# Patient Record
Sex: FEMALE | Race: WHITE | Employment: OTHER
[De-identification: names, ages, dates, MRNs, and addresses within clinical notes are randomized per-mention and may not be internally consistent; named-entity substitution may affect disease eponyms.]

---

## 2017-06-03 ENCOUNTER — HEALTH MAINTENANCE LETTER (OUTPATIENT)
Age: 57
End: 2017-06-03

## 2017-09-15 ENCOUNTER — HOSPITAL ENCOUNTER (EMERGENCY)
Facility: CLINIC | Age: 57
Discharge: HOME | End: 2017-09-15
Attending: EMERGENCY MEDICINE | Admitting: EMERGENCY MEDICINE
Payer: COMMERCIAL

## 2017-09-15 VITALS
RESPIRATION RATE: 18 BRPM | BODY MASS INDEX: 24.99 KG/M2 | HEART RATE: 56 BPM | DIASTOLIC BLOOD PRESSURE: 93 MMHG | SYSTOLIC BLOOD PRESSURE: 144 MMHG | OXYGEN SATURATION: 99 % | TEMPERATURE: 97.3 F | WEIGHT: 150 LBS | HEIGHT: 65 IN

## 2017-09-15 DIAGNOSIS — R42 VERTIGO: ICD-10-CM

## 2017-09-15 DIAGNOSIS — E86.0 DEHYDRATION: ICD-10-CM

## 2017-09-15 LAB
ANION GAP SERPL CALCULATED.3IONS-SCNC: 7 MMOL/L (ref 3–14)
BASOPHILS # BLD AUTO: 0 10E9/L (ref 0–0.2)
BASOPHILS NFR BLD AUTO: 0.5 %
BUN SERPL-MCNC: 16 MG/DL (ref 7–30)
CALCIUM SERPL-MCNC: 9.4 MG/DL (ref 8.5–10.1)
CHLORIDE SERPL-SCNC: 105 MMOL/L (ref 94–109)
CO2 SERPL-SCNC: 28 MMOL/L (ref 20–32)
CREAT SERPL-MCNC: 0.75 MG/DL (ref 0.52–1.04)
DIFFERENTIAL METHOD BLD: NORMAL
EOSINOPHIL # BLD AUTO: 0.1 10E9/L (ref 0–0.7)
EOSINOPHIL NFR BLD AUTO: 1.7 %
ERYTHROCYTE [DISTWIDTH] IN BLOOD BY AUTOMATED COUNT: 12.4 % (ref 10–15)
GFR SERPL CREATININE-BSD FRML MDRD: 79 ML/MIN/1.7M2
GLUCOSE SERPL-MCNC: 106 MG/DL (ref 70–99)
HCT VFR BLD AUTO: 43.1 % (ref 35–47)
HGB BLD-MCNC: 13.9 G/DL (ref 11.7–15.7)
IMM GRANULOCYTES # BLD: 0 10E9/L (ref 0–0.4)
IMM GRANULOCYTES NFR BLD: 0.3 %
LYMPHOCYTES # BLD AUTO: 0.8 10E9/L (ref 0.8–5.3)
LYMPHOCYTES NFR BLD AUTO: 10.2 %
MCH RBC QN AUTO: 29.5 PG (ref 26.5–33)
MCHC RBC AUTO-ENTMCNC: 32.3 G/DL (ref 31.5–36.5)
MCV RBC AUTO: 92 FL (ref 78–100)
MONOCYTES # BLD AUTO: 0.4 10E9/L (ref 0–1.3)
MONOCYTES NFR BLD AUTO: 5.5 %
NEUTROPHILS # BLD AUTO: 6.4 10E9/L (ref 1.6–8.3)
NEUTROPHILS NFR BLD AUTO: 81.8 %
NRBC # BLD AUTO: 0 10*3/UL
NRBC BLD AUTO-RTO: 0 /100
PLATELET # BLD AUTO: 229 10E9/L (ref 150–450)
POTASSIUM SERPL-SCNC: 3.7 MMOL/L (ref 3.4–5.3)
RBC # BLD AUTO: 4.71 10E12/L (ref 3.8–5.2)
SODIUM SERPL-SCNC: 140 MMOL/L (ref 133–144)
WBC # BLD AUTO: 7.9 10E9/L (ref 4–11)

## 2017-09-15 PROCEDURE — 85025 COMPLETE CBC W/AUTO DIFF WBC: CPT | Performed by: EMERGENCY MEDICINE

## 2017-09-15 PROCEDURE — 25000128 H RX IP 250 OP 636: Performed by: EMERGENCY MEDICINE

## 2017-09-15 PROCEDURE — 99283 EMERGENCY DEPT VISIT LOW MDM: CPT | Mod: 25

## 2017-09-15 PROCEDURE — 96360 HYDRATION IV INFUSION INIT: CPT

## 2017-09-15 PROCEDURE — 25000131 ZZH RX MED GY IP 250 OP 636 PS 637: Performed by: EMERGENCY MEDICINE

## 2017-09-15 PROCEDURE — 80048 BASIC METABOLIC PNL TOTAL CA: CPT | Performed by: EMERGENCY MEDICINE

## 2017-09-15 RX ORDER — SODIUM CHLORIDE 9 MG/ML
1000 INJECTION, SOLUTION INTRAVENOUS CONTINUOUS
Status: DISCONTINUED | OUTPATIENT
Start: 2017-09-15 | End: 2017-09-15 | Stop reason: HOSPADM

## 2017-09-15 RX ORDER — MECLIZINE HYDROCHLORIDE 25 MG/1
25 TABLET ORAL ONCE
Status: COMPLETED | OUTPATIENT
Start: 2017-09-15 | End: 2017-09-15

## 2017-09-15 RX ORDER — MECLIZINE HYDROCHLORIDE 25 MG/1
25 TABLET ORAL EVERY 6 HOURS PRN
Qty: 30 TABLET | Refills: 1 | Status: SHIPPED | OUTPATIENT
Start: 2017-09-15

## 2017-09-15 RX ORDER — LIDOCAINE 40 MG/G
CREAM TOPICAL
Status: DISCONTINUED | OUTPATIENT
Start: 2017-09-15 | End: 2017-09-15 | Stop reason: HOSPADM

## 2017-09-15 RX ADMIN — SODIUM CHLORIDE 1000 ML: 9 INJECTION, SOLUTION INTRAVENOUS at 09:14

## 2017-09-15 RX ADMIN — MECLIZINE HYDROCHLORIDE 25 MG: 25 TABLET ORAL at 09:14

## 2017-09-15 ASSESSMENT — ENCOUNTER SYMPTOMS
HEADACHES: 0
NAUSEA: 0
PHOTOPHOBIA: 0
WEAKNESS: 1
FATIGUE: 1
DIARRHEA: 0
FEVER: 0
CHOKING: 0
SHORTNESS OF BREATH: 0
LIGHT-HEADEDNESS: 0
DIZZINESS: 1
NUMBNESS: 0
VOMITING: 0

## 2017-09-15 NOTE — ED PROVIDER NOTES
History     Chief Complaint:  Dizziness    HPI   Josie Childs is a 57 year old female who presents with dizziness. The patient reports that last night at approximately 2245 the patient had a sudden onset of vertigo-like symptoms with blurred vision and dizziness as if the room was spinning. She states her symptoms resolved overnight but this morning she woke up feeling abnormally weak/fatigues; she states her vertigo like symptoms returned when she turned over in bed as well. She has not had any trauma to her head or any other area, chest pains, numbness, or any other symptoms.    Allergies:  Carafate  Nexium  Tegretol     Medications:    hydrocortisone (PROCTOSOL HC) 2.5 % rectal cream  lovastatin (MEVACOR) 40 MG tablet  ranitidine (ZANTAC) 150 MG capsule  polycarbophil (FIBERCON) 625 MG tablet  polyethylene glycol (MIRALAX/GLYCOLAX) powder  EFFEXOR 37.5 MG OR TABS  ALPRAZOLAM  MIRALAX OR     Past Medical History:    Esophageal reflux  Fibromyalgia     Past Surgical History:    No pertinent past surgical history.    Family History:    Mother-Parkinson's  Father-diabetes, alcohol/drug use    Social History:  Smoking status: Never smoker  Alcohol use: No  Marital Status:        Review of Systems   Constitutional: Positive for fatigue. Negative for fever.   Eyes: Positive for visual disturbance. Negative for photophobia.   Respiratory: Negative for choking and shortness of breath.    Gastrointestinal: Negative for diarrhea, nausea and vomiting.   Neurological: Positive for dizziness and weakness. Negative for syncope, light-headedness, numbness and headaches.   All other systems reviewed and are negative.      Physical Exam     Patient Vitals for the past 24 hrs:   BP Temp Temp src Pulse Resp SpO2 Height Weight   09/15/17 0945 (!) 144/93 - - - - 99 % - -   09/15/17 0930 135/88 - - - - 100 % - -   09/15/17 0915 148/74 - - - - 98 % - -   09/15/17 0834 159/87 97.3  F (36.3  C) Temporal 56 18 100 % 1.651 m  "(5' 5\") 68 kg (150 lb)       Physical Exam  General: Patient is alert and interactive when I enter the room  Head:  The scalp, face, and head appear normal  Eyes:  The pupils are equal, round, and reactive to light    Conjunctivae and sclerae are normal  ENT:    External acoustic canals are normal    The oropharynx is normal without erythema.     Uvula is in the midline  Neck:  Normal range of motion  CV:  Regular rate. S1/S2. No murmurs.   Resp:  Lungs are clear without wheezes or rales. No distress  GI:  Abdomen is soft, no rigidity, guarding, or rebound    No distension. No tenderness to palpation in any quadrant.     MS:  Normal tone. Joints grossly normal without effusions.     No asymmetric leg swelling, calf or thigh tenderness.      Normal motor assessment of all extremities.  Skin:  No rash or lesions noted. Normal capillary refill noted  Neuro: CN's II-XII without obvious abnormality.  Gait is normal w/o ataxia.  Neg Romberg.  5/5 UE and LE strength which is symmetrical.     Reflexes are 2+ and symmetrical. Negative Pronator drift.      Finger to Nose testing is intact bilaterally.  No nystagmus.     Light touch is symmetrical bilateral UE's and LE's.  PERRLA, EOMI.      No meningismus and full neck AROM/PROM.  Psych:  Awake. Alert.  Normal affect.  Appropriate interactions.  Lymph: No anterior cervical lymphadenopathy noted    Emergency Department Course   Laboratory:  CBC:  WBC 7.9, HGB 13.9, , WNL  BMP: Glucose 106 (H), otherwise WNL (Creatinine 0.75)    Interventions:  (0914) Normal Saline, 1 liter, IV bolus  (0914) Meclizine 25mg PO once.     Emergency Department Course:  Nursing notes and vitals reviewed.  (0858) I performed an exam of the patient as documented above.    Blood was drawn from the patient. This was sent for laboratory testing, findings above.     Findings and plan explained to the patient. Patient discharged home with instructions regarding supportive care, medications, and " reasons to return. The importance of close follow-up was reviewed. The patient was prescribed Meclizine.      Impression & Plan      Medical Decision Making:  Josie Childs is a 57 year old female who presents for evaluation of vertigo. The differential diagnosis of vertigo is broad and includes common etiologies such as menieres disease, labyrinthitis, benign positional vertigo, otitis media, etc.  More serious etiologies considered include central etiologies such as tumor, intracerebral bleed, dissection, ischemic cerebral vascular accident.  The history, physical exam including detailed neurologic exam, and workup in the emergency room suggests a benign cause of vertigo today.  Patient feels improved after interventions noted above. Further outpatient management is indicated with vertigo medications.       No indication for advanced imaging at this point (CT/MRI) as there are no definite signs of a central and concerning etiology for the vertigo.      Vertigo precautions given for home.        Diagnosis:    ICD-10-CM   1. Vertigo R42   2. Dehydration E86.0       Disposition:  Patient is discharged to home.      Discharge Medications:  New Prescriptions    MECLIZINE (ANTIVERT) 25 MG TABLET    Take 1 tablet (25 mg) by mouth every 6 hours as needed for dizziness       Cole NEVES, am serving as a scribe on 9/15/2017 at 8:58 AM to personally document services performed by Dr. Talavera based on my observations and the provider's statements to me.      Cole Fernandez  9/15/2017   [facility] EMERGENCY DEPARTMENT       James Talavera MD  09/15/17 9989

## 2017-09-15 NOTE — ED AVS SNAPSHOT
[facility] Emergency Department    [address] [place] [address]    Phone:  [phone]    Fax:  [phone]                                       Josie Childs   MRN: [id number]    Department:  [facility] Emergency Department   Date of Visit:  9/15/2017           Patient Information     Date Of Birth          1960        Your diagnoses for this visit were:     Vertigo     Dehydration        You were seen by James Talavera MD.      Follow-up Information     Follow up with Clinic, [facility] In 3 days.    Contact information:    [phone]          Discharge Instructions         Managing Dizziness (Vertigo) with Medicines    Although medicines can't cure your problem, they can help control symptoms. Your doctor may prescribe medicines for a few weeks and then taper them off. Always take your medicine as prescribed. Never share your medicine with others.  Contact your healthcare provider right away if you have side effects from your medicines.   How medicines can help    Treat infection or inflammation. If you have an infection caused by bacteria, your doctor can prescribe antibiotics.    Limit conflicting balance signals. These medicines are often in pill form.    Ease nausea. Suppositories, pills, or shots can reduce vomiting.    Reduce pressure in the canals. Diuretics can be used to treat Meniere's disease. These medicines help your body get rid of extra fluid.    Ease other symptoms. Other medicines can help ease depression and anxiety caused by living with dizziness or fainting.  Date Last Reviewed: 11/1/2016 2000-2017 [employer]. [address] [place] [address], [place] [address]. All rights reserved. This information is not intended as a substitute for professional medical care. Always follow your healthcare professional's instructions.          24 Hour Appointment Hotline       To make an appointment at any [facility],  "call [phone] ([phone]). If you don't have a family doctor or clinic, we will help you find one. [place] clinics are conveniently located to serve the needs of you and your family.          ED Discharge Orders     PHYSICAL THERAPY REFERRAL       *This therapy referral will be filtered to a centralized scheduling office at [facility] and the patient will receive a call to schedule an appointment at a [place] location most convenient for them. *     [facility] provides Physical Therapy evaluation and treatment and many specialty services across the [place] system.  If requesting a specialty program, please choose from the list below.    If you have not heard from the scheduling office within 2 business days, please call [phone] for all locations, with the exception of [place], please call [phone].  Treatment: Evaluation & Treatment  Special Instructions/Modalities:     Special Programs: Balance/Vestibular    Please be aware that coverage of these services is subject to the terms and limitations of your health insurance plan.  Call member services at your health plan with any benefit or coverage questions.      **Note to Provider:  If you are referring outside of [place] for the therapy appointment, please list the name of the location in the \"special instructions\" above, print the referral and give to the patient to schedule the appointment.                     Review of your medicines      START taking        Dose / Directions Last dose taken    meclizine 25 MG tablet   Commonly known as:  ANTIVERT   Dose:  25 mg   Quantity:  30 tablet        Take 1 tablet (25 mg) by mouth every 6 hours as needed for dizziness   Refills:  1          Our records show that you are taking the medicines listed below. If these are incorrect, please call your family doctor or clinic.        Dose / Directions Last dose taken    ALPRAZOLAM        None Entered   Refills:  0 "        calcium polycarbophil 625 MG tablet   Commonly known as:  FIBERCON   Dose:  1 tablet   Quantity:  30 tablet        Take 1 tablet by mouth 2 times daily as needed.   Refills:  12        EFFEXOR 37.5 MG tablet   Generic drug:  venlafaxine        1 TABLET TWICE DAILY WITH FOOD   Refills:  0        hydrocortisone 2.5 % cream   Commonly known as:  PROCTOSOL HC        Denied, pt aware needs to be seen for rx, fyi   Refills:  0        lovastatin 40 MG tablet   Commonly known as:  MEVACOR   Dose:  40 mg   Quantity:  90 tablet        Take 1 tablet by mouth At Bedtime.   Refills:  3        * MIRALAX PO        None Entered   Refills:  0        * polyethylene glycol powder   Commonly known as:  MIRALAX/GLYCOLAX   Dose:  1 capful   Quantity:  510 g        Take 17 g by mouth daily.   Refills:  0        ranitidine 150 MG capsule   Commonly known as:  ZANTAC   Dose:  150 mg   Quantity:  180 capsule        Take 1 capsule by mouth 2 times daily.   Refills:  3        * Notice:  This list has 2 medication(s) that are the same as other medications prescribed for you. Read the directions carefully, and ask your doctor or other care provider to review them with you.            Prescriptions were sent or printed at these locations (1 Prescription)                   Other Prescriptions                Printed at Department/Unit printer (1 of 1)         meclizine (ANTIVERT) 25 MG tablet                Procedures and tests performed during your visit     Basic metabolic panel    CBC with platelets differential    Peripheral IV catheter      Orders Needing Specimen Collection     None      Pending Results     No orders found from 9/13/2017 to 9/16/2017.            Pending Culture Results     No orders found from 9/13/2017 to 9/16/2017.            Pending Results Instructions     If you had any lab results that were not finalized at the time of your Discharge, you can call the ED Lab Result RN at [phone]. You will be contacted by  this team for any positive Lab results or changes in treatment. The nurses are available 7 days a week from 10A to 6:30P.  You can leave a message 24 hours per day and they will return your call.        Test Results From Your Hospital Stay        9/15/2017  9:28 AM      Component Results     Component Value Ref Range & Units Status    WBC 7.9 4.0 - 11.0 10e9/L Final    RBC Count 4.71 3.8 - 5.2 10e12/L Final    Hemoglobin 13.9 11.7 - 15.7 g/dL Final    Hematocrit 43.1 35.0 - 47.0 % Final    MCV 92 78 - 100 fl Final    MCH 29.5 26.5 - 33.0 pg Final    MCHC 32.3 31.5 - 36.5 g/dL Final    RDW 12.4 10.0 - 15.0 % Final    Platelet Count 229 150 - 450 10e9/L Final    Diff Method Automated Method  Final    % Neutrophils 81.8 % Final    % Lymphocytes 10.2 % Final    % Monocytes 5.5 % Final    % Eosinophils 1.7 % Final    % Basophils 0.5 % Final    % Immature Granulocytes 0.3 % Final    Nucleated RBCs 0 0 /100 Final    Absolute Neutrophil 6.4 1.6 - 8.3 10e9/L Final    Absolute Lymphocytes 0.8 0.8 - 5.3 10e9/L Final    Absolute Monocytes 0.4 0.0 - 1.3 10e9/L Final    Absolute Eosinophils 0.1 0.0 - 0.7 10e9/L Final    Absolute Basophils 0.0 0.0 - 0.2 10e9/L Final    Abs Immature Granulocytes 0.0 0 - 0.4 10e9/L Final    Absolute Nucleated RBC 0.0  Final         9/15/2017  9:43 AM      Component Results     Component Value Ref Range & Units Status    Sodium 140 133 - 144 mmol/L Final    Potassium 3.7 3.4 - 5.3 mmol/L Final    Chloride 105 94 - 109 mmol/L Final    Carbon Dioxide 28 20 - 32 mmol/L Final    Anion Gap 7 3 - 14 mmol/L Final    Glucose 106 (H) 70 - 99 mg/dL Final    Urea Nitrogen 16 7 - 30 mg/dL Final    Creatinine 0.75 0.52 - 1.04 mg/dL Final    GFR Estimate 79 >60 mL/min/1.7m2 Final    Non  GFR Calc    GFR Estimate If Black >90 >60 mL/min/1.7m2 Final    African American GFR Calc    Calcium 9.4 8.5 - 10.1 mg/dL Final                Clinical Quality Measure: Blood Pressure Screening     Your blood  "pressure was checked while you were in the emergency department today. The last reading we obtained was  BP: (!) 144/93 . Please read the guidelines below about what these numbers mean and what you should do about them.  If your systolic blood pressure (the top number) is less than 120 and your diastolic blood pressure (the bottom number) is less than 80, then your blood pressure is normal. There is nothing more that you need to do about it.  If your systolic blood pressure (the top number) is 120-139 or your diastolic blood pressure (the bottom number) is 80-89, your blood pressure may be higher than it should be. You should have your blood pressure rechecked within a year by a primary care provider.  If your systolic blood pressure (the top number) is 140 or greater or your diastolic blood pressure (the bottom number) is 90 or greater, you may have high blood pressure. High blood pressure is treatable, but if left untreated over time it can put you at risk for heart attack, stroke, or kidney failure. You should have your blood pressure rechecked by a primary care provider within the next 4 weeks.  If your provider in the emergency department today gave you specific instructions to follow-up with your doctor or provider even sooner than that, you should follow that instruction and not wait for up to 4 weeks for your follow-up visit.        Thank you for choosing [facility]       Thank you for choosing [facility] for your care. Our goal is always to provide you with excellent care. Hearing back from our patients is one way we can continue to improve our services. Please take a few minutes to complete the written survey that you may receive in the mail after you visit with us. Thank you!        [employer] Information     [employer] lets you send messages to your doctor, view your test results, renew your prescriptions, schedule appointments and more. To sign up, go to [email or web address] . Click on \"Log in\" on the left " "side of the screen, which will take you to the Welcome page. Then click on \"Sign up Now\" on the right side of the page.     You will be asked to enter the access code listed below, as well as some personal information. Please follow the directions to create your username and password.     Your access code is: [email or web address]  Expires: 2017 10:41 AM     Your access code will  in 90 days. If you need help or a new code, please call your [facility] clinic or [phone].        Care EveryWhere ID     This is your Care EveryWhere ID. This could be used by other organizations to access your [facility] medical records  [id number]        Equal Access to Services     MILKA CRABTREE : Joyce Mosley, oliva hung, lachelle soto, amara beaulieu. So [facility] [phone].    ATENCIÓN: Si habla español, tiene a gustafson disposición servicios gratuitos de asistencia lingüística. Llame al [phone].    We comply with applicable federal civil rights laws and [place] laws. We do not discriminate on the basis of race, color, national origin, age, disability sex, sexual orientation or gender identity.            After Visit Summary       This is your record. Keep this with you and show to your community pharmacist(s) and doctor(s) at your next visit.                  "

## 2017-09-15 NOTE — DISCHARGE INSTRUCTIONS
Managing Dizziness (Vertigo) with Medicines    Although medicines can't cure your problem, they can help control symptoms. Your doctor may prescribe medicines for a few weeks and then taper them off. Always take your medicine as prescribed. Never share your medicine with others.  Contact your healthcare provider right away if you have side effects from your medicines.   How medicines can help    Treat infection or inflammation. If you have an infection caused by bacteria, your doctor can prescribe antibiotics.    Limit conflicting balance signals. These medicines are often in pill form.    Ease nausea. Suppositories, pills, or shots can reduce vomiting.    Reduce pressure in the canals. Diuretics can be used to treat Meniere's disease. These medicines help your body get rid of extra fluid.    Ease other symptoms. Other medicines can help ease depression and anxiety caused by living with dizziness or fainting.  Date Last Reviewed: 11/1/2016 2000-2017 [employer]. [address] [place] [address], [place] [address]. All rights reserved. This information is not intended as a substitute for professional medical care. Always follow your healthcare professional's instructions.

## 2017-09-15 NOTE — ED NOTES
"ABCs intact. Pt c/o onset of dizziness starting around 2245 last night. Pt states she laid down and when she rolled over, dizziness got worse. Pt states the \"room was spinning\". Pt states has low bg but has never been dx. Pt would like her BG checked.  "

## 2017-09-15 NOTE — ED NOTES
"Patient feels better after fluids and medication. During this discussion patient reported that she has been feeling \"overwhelmed\" with the \"passing of my parents three years ago.\" She discussed some relationship problems with siblings \"because of my parents passing.\" Daughter present with patient and elaborated on the past three years noting that her mom is \"still grieving.\" Notes that \"this is probably the result of my mental exhaustion.\"  Patient denies being suicidal or homicidal, reports having her anxiety \"controlled\" with Effexor. She does have a [employer] counselor that she sees and feels like she has a good support system. Discussed giving patient resources for psychologist and patient and daughter agreed. MD notified.    "

## 2017-09-15 NOTE — ED AVS SNAPSHOT
[facility] Emergency Department    [address]    [facility] [address]    Phone:  [phone]    Fax:  [phone]                                       Josie Childs   MRN: [id number]    Department:  [facility] Emergency Department   Date of Visit:  9/15/2017           After Visit Summary Signature Page     I have received my discharge instructions, and my questions have been answered. I have discussed any challenges I see with this plan with the nurse or doctor.    ..........................................................................................................................................  Patient/Patient Representative Signature      ..........................................................................................................................................  Patient Representative Print Name and Relationship to Patient    ..................................................               ................................................  Date                                            Time    ..........................................................................................................................................  Reviewed by Signature/Title    ...................................................              ..............................................  Date                                                            Time

## 2018-10-31 ENCOUNTER — HOSPITAL ENCOUNTER (EMERGENCY)
Facility: CLINIC | Age: 58
Discharge: ANOTHER HOSPITAL | End: 2018-10-31
Attending: EMERGENCY MEDICINE | Admitting: INTERNAL MEDICINE
Payer: COMMERCIAL

## 2018-10-31 ENCOUNTER — APPOINTMENT (OUTPATIENT)
Dept: GENERAL RADIOLOGY | Facility: CLINIC | Age: 58
End: 2018-10-31
Attending: EMERGENCY MEDICINE
Payer: COMMERCIAL

## 2018-10-31 ENCOUNTER — HOSPITAL ENCOUNTER (INPATIENT)
Facility: CLINIC | Age: 58
LOS: 3 days | Discharge: HOME | DRG: 470 | End: 2018-11-03
Attending: INTERNAL MEDICINE | Admitting: HOSPITALIST
Payer: COMMERCIAL

## 2018-10-31 VITALS
HEART RATE: 85 BPM | OXYGEN SATURATION: 96 % | RESPIRATION RATE: 20 BRPM | TEMPERATURE: 97.7 F | DIASTOLIC BLOOD PRESSURE: 70 MMHG | WEIGHT: 155 LBS | SYSTOLIC BLOOD PRESSURE: 103 MMHG | HEIGHT: 65 IN | BODY MASS INDEX: 25.83 KG/M2

## 2018-10-31 DIAGNOSIS — S72.001A CLOSED FRACTURE OF RIGHT HIP, INITIAL ENCOUNTER (H): Primary | ICD-10-CM

## 2018-10-31 DIAGNOSIS — S72.001A CLOSED FRACTURE OF NECK OF RIGHT FEMUR, INITIAL ENCOUNTER (H): ICD-10-CM

## 2018-10-31 PROBLEM — S72.009A HIP FRACTURE (H): Status: ACTIVE | Noted: 2018-10-31

## 2018-10-31 LAB
ANION GAP SERPL CALCULATED.3IONS-SCNC: 8 MMOL/L (ref 3–14)
BASOPHILS # BLD AUTO: 0 10E9/L (ref 0–0.2)
BASOPHILS NFR BLD AUTO: 0.3 %
BUN SERPL-MCNC: 16 MG/DL (ref 7–30)
CALCIUM SERPL-MCNC: 8.5 MG/DL (ref 8.5–10.1)
CHLORIDE SERPL-SCNC: 108 MMOL/L (ref 94–109)
CO2 SERPL-SCNC: 26 MMOL/L (ref 20–32)
CREAT SERPL-MCNC: 0.76 MG/DL (ref 0.52–1.04)
DIFFERENTIAL METHOD BLD: ABNORMAL
EOSINOPHIL # BLD AUTO: 0.1 10E9/L (ref 0–0.7)
EOSINOPHIL NFR BLD AUTO: 0.5 %
ERYTHROCYTE [DISTWIDTH] IN BLOOD BY AUTOMATED COUNT: 12.8 % (ref 10–15)
GFR SERPL CREATININE-BSD FRML MDRD: 78 ML/MIN/1.7M2
GLUCOSE SERPL-MCNC: 93 MG/DL (ref 70–99)
HCT VFR BLD AUTO: 33.6 % (ref 35–47)
HCT VFR BLD AUTO: 38.7 % (ref 35–47)
HGB BLD-MCNC: 11.2 G/DL (ref 11.7–15.7)
HGB BLD-MCNC: 12.7 G/DL (ref 11.7–15.7)
IMM GRANULOCYTES # BLD: 0.1 10E9/L (ref 0–0.4)
IMM GRANULOCYTES NFR BLD: 0.7 %
INTERPRETATION ECG - MUSE: NORMAL
LYMPHOCYTES # BLD AUTO: 1.1 10E9/L (ref 0.8–5.3)
LYMPHOCYTES NFR BLD AUTO: 9 %
MCH RBC QN AUTO: 29.9 PG (ref 26.5–33)
MCHC RBC AUTO-ENTMCNC: 32.8 G/DL (ref 31.5–36.5)
MCV RBC AUTO: 91 FL (ref 78–100)
MONOCYTES # BLD AUTO: 0.7 10E9/L (ref 0–1.3)
MONOCYTES NFR BLD AUTO: 5.9 %
NEUTROPHILS # BLD AUTO: 9.9 10E9/L (ref 1.6–8.3)
NEUTROPHILS NFR BLD AUTO: 83.6 %
NRBC # BLD AUTO: 0 10*3/UL
NRBC BLD AUTO-RTO: 0 /100
PLATELET # BLD AUTO: 255 10E9/L (ref 150–450)
POTASSIUM SERPL-SCNC: 3.8 MMOL/L (ref 3.4–5.3)
RBC # BLD AUTO: 4.25 10E12/L (ref 3.8–5.2)
SODIUM SERPL-SCNC: 142 MMOL/L (ref 133–144)
WBC # BLD AUTO: 11.8 10E9/L (ref 4–11)

## 2018-10-31 PROCEDURE — 96375 TX/PRO/DX INJ NEW DRUG ADDON: CPT

## 2018-10-31 PROCEDURE — 25000128 H RX IP 250 OP 636: Performed by: EMERGENCY MEDICINE

## 2018-10-31 PROCEDURE — 99285 EMERGENCY DEPT VISIT HI MDM: CPT | Mod: 25

## 2018-10-31 PROCEDURE — 12000007 ZZH R&B INTERMEDIATE

## 2018-10-31 PROCEDURE — 25000132 ZZH RX MED GY IP 250 OP 250 PS 637: Performed by: HOSPITALIST

## 2018-10-31 PROCEDURE — 25000128 H RX IP 250 OP 636

## 2018-10-31 PROCEDURE — 85025 COMPLETE CBC W/AUTO DIFF WBC: CPT | Performed by: EMERGENCY MEDICINE

## 2018-10-31 PROCEDURE — 85018 HEMOGLOBIN: CPT | Performed by: HOSPITALIST

## 2018-10-31 PROCEDURE — 36415 COLL VENOUS BLD VENIPUNCTURE: CPT | Performed by: HOSPITALIST

## 2018-10-31 PROCEDURE — 96361 HYDRATE IV INFUSION ADD-ON: CPT

## 2018-10-31 PROCEDURE — 72170 X-RAY EXAM OF PELVIS: CPT

## 2018-10-31 PROCEDURE — 85014 HEMATOCRIT: CPT | Performed by: HOSPITALIST

## 2018-10-31 PROCEDURE — 96376 TX/PRO/DX INJ SAME DRUG ADON: CPT

## 2018-10-31 PROCEDURE — 99223 1ST HOSP IP/OBS HIGH 75: CPT | Mod: AI | Performed by: INTERNAL MEDICINE

## 2018-10-31 PROCEDURE — 73552 X-RAY EXAM OF FEMUR 2/>: CPT | Mod: RT

## 2018-10-31 PROCEDURE — 36415 COLL VENOUS BLD VENIPUNCTURE: CPT | Performed by: EMERGENCY MEDICINE

## 2018-10-31 PROCEDURE — 96374 THER/PROPH/DIAG INJ IV PUSH: CPT

## 2018-10-31 PROCEDURE — 25000132 ZZH RX MED GY IP 250 OP 250 PS 637: Performed by: EMERGENCY MEDICINE

## 2018-10-31 PROCEDURE — 99222 1ST HOSP IP/OBS MODERATE 55: CPT | Mod: AI | Performed by: HOSPITALIST

## 2018-10-31 PROCEDURE — 80048 BASIC METABOLIC PNL TOTAL CA: CPT | Performed by: EMERGENCY MEDICINE

## 2018-10-31 PROCEDURE — 25000128 H RX IP 250 OP 636: Performed by: HOSPITALIST

## 2018-10-31 RX ORDER — DIAZEPAM 2 MG
2 TABLET ORAL EVERY 6 HOURS PRN
Status: DISCONTINUED | OUTPATIENT
Start: 2018-10-31 | End: 2018-11-03 | Stop reason: HOSPADM

## 2018-10-31 RX ORDER — ONDANSETRON 4 MG/1
4 TABLET, ORALLY DISINTEGRATING ORAL EVERY 6 HOURS PRN
Status: CANCELLED | OUTPATIENT
Start: 2018-10-31

## 2018-10-31 RX ORDER — POTASSIUM CHLORIDE 1.5 G/1.58G
20-40 POWDER, FOR SOLUTION ORAL
Status: DISCONTINUED | OUTPATIENT
Start: 2018-10-31 | End: 2018-11-03 | Stop reason: HOSPADM

## 2018-10-31 RX ORDER — HYDROMORPHONE HYDROCHLORIDE 1 MG/ML
0.5 INJECTION, SOLUTION INTRAMUSCULAR; INTRAVENOUS; SUBCUTANEOUS
Status: COMPLETED | OUTPATIENT
Start: 2018-10-31 | End: 2018-10-31

## 2018-10-31 RX ORDER — ALPRAZOLAM 0.5 MG
0.5 TABLET ORAL AT BEDTIME
COMMUNITY

## 2018-10-31 RX ORDER — PROCHLORPERAZINE 25 MG
25 SUPPOSITORY, RECTAL RECTAL EVERY 12 HOURS PRN
Status: CANCELLED | OUTPATIENT
Start: 2018-10-31

## 2018-10-31 RX ORDER — DEXTROSE MONOHYDRATE, SODIUM CHLORIDE, AND POTASSIUM CHLORIDE 50; 1.49; 9 G/1000ML; G/1000ML; G/1000ML
INJECTION, SOLUTION INTRAVENOUS CONTINUOUS
Status: CANCELLED | OUTPATIENT
Start: 2018-11-01

## 2018-10-31 RX ORDER — IBUPROFEN 600 MG/1
600 TABLET, FILM COATED ORAL EVERY 6 HOURS PRN
Status: CANCELLED | OUTPATIENT
Start: 2018-10-31

## 2018-10-31 RX ORDER — CYCLOBENZAPRINE HCL 10 MG
10 TABLET ORAL ONCE
Status: COMPLETED | OUTPATIENT
Start: 2018-10-31 | End: 2018-10-31

## 2018-10-31 RX ORDER — PROCHLORPERAZINE MALEATE 10 MG
10 TABLET ORAL EVERY 6 HOURS PRN
Status: CANCELLED | OUTPATIENT
Start: 2018-10-31

## 2018-10-31 RX ORDER — VENLAFAXINE 37.5 MG/1
37.5 TABLET ORAL EVERY MORNING
Status: CANCELLED | OUTPATIENT
Start: 2018-11-01

## 2018-10-31 RX ORDER — POLYETHYLENE GLYCOL 3350 17 G/17G
17 POWDER, FOR SOLUTION ORAL DAILY PRN
Status: CANCELLED | OUTPATIENT
Start: 2018-10-31

## 2018-10-31 RX ORDER — DOCUSATE SODIUM 100 MG/1
100 CAPSULE, LIQUID FILLED ORAL 2 TIMES DAILY
Status: CANCELLED | OUTPATIENT
Start: 2018-10-31

## 2018-10-31 RX ORDER — ONDANSETRON 2 MG/ML
4 INJECTION INTRAMUSCULAR; INTRAVENOUS EVERY 6 HOURS PRN
Status: CANCELLED | OUTPATIENT
Start: 2018-10-31

## 2018-10-31 RX ORDER — SODIUM CHLORIDE 9 MG/ML
INJECTION, SOLUTION INTRAVENOUS CONTINUOUS
Status: DISCONTINUED | OUTPATIENT
Start: 2018-11-01 | End: 2018-11-03 | Stop reason: HOSPADM

## 2018-10-31 RX ORDER — ACETAMINOPHEN 325 MG/1
650 TABLET ORAL EVERY 4 HOURS PRN
Status: CANCELLED | OUTPATIENT
Start: 2018-10-31

## 2018-10-31 RX ORDER — FENTANYL CITRATE 50 UG/ML
50 INJECTION, SOLUTION INTRAMUSCULAR; INTRAVENOUS ONCE
Status: COMPLETED | OUTPATIENT
Start: 2018-10-31 | End: 2018-10-31

## 2018-10-31 RX ORDER — POTASSIUM CHLORIDE 7.45 MG/ML
10 INJECTION INTRAVENOUS
Status: DISCONTINUED | OUTPATIENT
Start: 2018-10-31 | End: 2018-11-03 | Stop reason: HOSPADM

## 2018-10-31 RX ORDER — ONDANSETRON 2 MG/ML
4 INJECTION INTRAMUSCULAR; INTRAVENOUS EVERY 6 HOURS PRN
Status: DISCONTINUED | OUTPATIENT
Start: 2018-10-31 | End: 2018-11-01

## 2018-10-31 RX ORDER — FENTANYL CITRATE 50 UG/ML
INJECTION, SOLUTION INTRAMUSCULAR; INTRAVENOUS
Status: COMPLETED
Start: 2018-10-31 | End: 2018-10-31

## 2018-10-31 RX ORDER — HYDROMORPHONE HYDROCHLORIDE 1 MG/ML
.3-.5 INJECTION, SOLUTION INTRAMUSCULAR; INTRAVENOUS; SUBCUTANEOUS
Status: CANCELLED | OUTPATIENT
Start: 2018-10-31

## 2018-10-31 RX ORDER — NALOXONE HYDROCHLORIDE 0.4 MG/ML
.1-.4 INJECTION, SOLUTION INTRAMUSCULAR; INTRAVENOUS; SUBCUTANEOUS
Status: CANCELLED | OUTPATIENT
Start: 2018-10-31

## 2018-10-31 RX ORDER — BISACODYL 10 MG
10 SUPPOSITORY, RECTAL RECTAL DAILY PRN
Status: DISCONTINUED | OUTPATIENT
Start: 2018-10-31 | End: 2018-11-03 | Stop reason: HOSPADM

## 2018-10-31 RX ORDER — ONDANSETRON 4 MG/1
4 TABLET, ORALLY DISINTEGRATING ORAL EVERY 6 HOURS PRN
Status: DISCONTINUED | OUTPATIENT
Start: 2018-10-31 | End: 2018-11-01

## 2018-10-31 RX ORDER — ACETAMINOPHEN 325 MG/1
650 TABLET ORAL EVERY 4 HOURS PRN
Status: DISCONTINUED | OUTPATIENT
Start: 2018-10-31 | End: 2018-11-01

## 2018-10-31 RX ORDER — OXYCODONE HYDROCHLORIDE 5 MG/1
5 TABLET ORAL EVERY 4 HOURS PRN
Status: DISCONTINUED | OUTPATIENT
Start: 2018-10-31 | End: 2018-11-02

## 2018-10-31 RX ORDER — LORAZEPAM 2 MG/ML
0.5 INJECTION INTRAMUSCULAR ONCE
Status: COMPLETED | OUTPATIENT
Start: 2018-10-31 | End: 2018-10-31

## 2018-10-31 RX ORDER — VENLAFAXINE HYDROCHLORIDE 37.5 MG/1
37.5 CAPSULE, EXTENDED RELEASE ORAL EVERY MORNING
COMMUNITY

## 2018-10-31 RX ORDER — NALOXONE HYDROCHLORIDE 0.4 MG/ML
.1-.4 INJECTION, SOLUTION INTRAMUSCULAR; INTRAVENOUS; SUBCUTANEOUS
Status: DISCONTINUED | OUTPATIENT
Start: 2018-10-31 | End: 2018-11-02

## 2018-10-31 RX ORDER — POTASSIUM CHLORIDE 29.8 MG/ML
20 INJECTION INTRAVENOUS
Status: DISCONTINUED | OUTPATIENT
Start: 2018-10-31 | End: 2018-11-03 | Stop reason: HOSPADM

## 2018-10-31 RX ORDER — ACETAMINOPHEN 650 MG/1
650 SUPPOSITORY RECTAL EVERY 4 HOURS PRN
Status: DISCONTINUED | OUTPATIENT
Start: 2018-10-31 | End: 2018-11-01

## 2018-10-31 RX ORDER — LOVASTATIN 20 MG
40 TABLET ORAL EVERY EVENING
Status: DISCONTINUED | OUTPATIENT
Start: 2018-10-31 | End: 2018-11-03 | Stop reason: HOSPADM

## 2018-10-31 RX ORDER — VENLAFAXINE HYDROCHLORIDE 37.5 MG/1
37.5 CAPSULE, EXTENDED RELEASE ORAL EVERY MORNING
Status: DISCONTINUED | OUTPATIENT
Start: 2018-11-01 | End: 2018-11-03 | Stop reason: HOSPADM

## 2018-10-31 RX ORDER — OXYCODONE HYDROCHLORIDE 5 MG/1
5-10 TABLET ORAL EVERY 4 HOURS PRN
Status: CANCELLED | OUTPATIENT
Start: 2018-10-31

## 2018-10-31 RX ORDER — POLYETHYLENE GLYCOL 3350 17 G/17G
1 POWDER, FOR SOLUTION ORAL DAILY PRN
COMMUNITY

## 2018-10-31 RX ORDER — AMOXICILLIN 250 MG
1 CAPSULE ORAL 2 TIMES DAILY
Status: DISCONTINUED | OUTPATIENT
Start: 2018-10-31 | End: 2018-11-01

## 2018-10-31 RX ORDER — SODIUM CHLORIDE 9 MG/ML
1000 INJECTION, SOLUTION INTRAVENOUS CONTINUOUS
Status: DISCONTINUED | OUTPATIENT
Start: 2018-10-31 | End: 2018-10-31 | Stop reason: HOSPADM

## 2018-10-31 RX ORDER — KETOROLAC TROMETHAMINE 30 MG/ML
30 INJECTION, SOLUTION INTRAMUSCULAR; INTRAVENOUS EVERY 6 HOURS PRN
Status: DISCONTINUED | OUTPATIENT
Start: 2018-10-31 | End: 2018-11-01 | Stop reason: DRUGHIGH

## 2018-10-31 RX ORDER — CYCLOBENZAPRINE HCL 10 MG
10 TABLET ORAL 3 TIMES DAILY PRN
Status: CANCELLED | OUTPATIENT
Start: 2018-10-31

## 2018-10-31 RX ORDER — ALPRAZOLAM 0.5 MG
0.5 TABLET ORAL
Status: CANCELLED | OUTPATIENT
Start: 2018-10-31

## 2018-10-31 RX ORDER — ALPRAZOLAM 0.5 MG
0.5 TABLET ORAL AT BEDTIME
Status: DISCONTINUED | OUTPATIENT
Start: 2018-10-31 | End: 2018-11-01

## 2018-10-31 RX ORDER — POTASSIUM CHLORIDE 1500 MG/1
20-40 TABLET, EXTENDED RELEASE ORAL
Status: DISCONTINUED | OUTPATIENT
Start: 2018-10-31 | End: 2018-11-03 | Stop reason: HOSPADM

## 2018-10-31 RX ORDER — MECLIZINE HYDROCHLORIDE 25 MG/1
25 TABLET ORAL EVERY 6 HOURS PRN
Status: DISCONTINUED | OUTPATIENT
Start: 2018-10-31 | End: 2018-11-03 | Stop reason: HOSPADM

## 2018-10-31 RX ORDER — POTASSIUM CL/LIDO/0.9 % NACL 10MEQ/0.1L
10 INTRAVENOUS SOLUTION, PIGGYBACK (ML) INTRAVENOUS
Status: DISCONTINUED | OUTPATIENT
Start: 2018-10-31 | End: 2018-11-03 | Stop reason: HOSPADM

## 2018-10-31 RX ORDER — AMOXICILLIN 250 MG
2 CAPSULE ORAL 2 TIMES DAILY PRN
Status: CANCELLED | OUTPATIENT
Start: 2018-10-31

## 2018-10-31 RX ORDER — AMOXICILLIN 250 MG
2 CAPSULE ORAL 2 TIMES DAILY
Status: DISCONTINUED | OUTPATIENT
Start: 2018-10-31 | End: 2018-11-01

## 2018-10-31 RX ORDER — AMOXICILLIN 250 MG
1 CAPSULE ORAL 2 TIMES DAILY PRN
Status: CANCELLED | OUTPATIENT
Start: 2018-10-31

## 2018-10-31 RX ORDER — POLYETHYLENE GLYCOL 3350 17 G/17G
17 POWDER, FOR SOLUTION ORAL DAILY PRN
Status: DISCONTINUED | OUTPATIENT
Start: 2018-10-31 | End: 2018-11-03 | Stop reason: HOSPADM

## 2018-10-31 RX ADMIN — Medication 0.5 MG: at 17:31

## 2018-10-31 RX ADMIN — SENNOSIDES AND DOCUSATE SODIUM 1 TABLET: 8.6; 5 TABLET ORAL at 22:24

## 2018-10-31 RX ADMIN — SODIUM CHLORIDE 1000 ML: 9 INJECTION, SOLUTION INTRAVENOUS at 16:52

## 2018-10-31 RX ADMIN — FENTANYL CITRATE 100 MCG: 50 INJECTION INTRAMUSCULAR; INTRAVENOUS at 14:24

## 2018-10-31 RX ADMIN — CYCLOBENZAPRINE HYDROCHLORIDE 10 MG: 10 TABLET, FILM COATED ORAL at 16:50

## 2018-10-31 RX ADMIN — SODIUM CHLORIDE: 9 INJECTION, SOLUTION INTRAVENOUS at 22:30

## 2018-10-31 RX ADMIN — Medication 0.5 MG: at 15:42

## 2018-10-31 RX ADMIN — LORAZEPAM 0.5 MG: 2 INJECTION INTRAMUSCULAR; INTRAVENOUS at 18:19

## 2018-10-31 RX ADMIN — Medication 0.5 MG: at 16:53

## 2018-10-31 RX ADMIN — OXYCODONE HYDROCHLORIDE 5 MG: 5 TABLET ORAL at 22:24

## 2018-10-31 RX ADMIN — SODIUM CHLORIDE 1000 ML: 9 INJECTION, SOLUTION INTRAVENOUS at 15:42

## 2018-10-31 RX ADMIN — FENTANYL CITRATE 100 MCG: 50 INJECTION, SOLUTION INTRAMUSCULAR; INTRAVENOUS at 14:24

## 2018-10-31 NOTE — IP AVS SNAPSHOT
[address] [facility]    [address] [place] [address]    Phone:  [phone]                                       After Visit Summary   10/31/2018    Josie Childs    MRN: [id number]           After Visit Summary Signature Page     I have received my discharge instructions, and my questions have been answered. I have discussed any challenges I see with this plan with the nurse or doctor.    ..........................................................................................................................................  Patient/Patient Representative Signature      ..........................................................................................................................................  Patient Representative Print Name and Relationship to Patient    ..................................................               ................................................  Date                                   Time    ..........................................................................................................................................  Reviewed by Signature/Title    ...................................................              ..............................................  Date                                               Time          22EPIC Rev 08/18

## 2018-10-31 NOTE — PHARMACY-ADMISSION MEDICATION HISTORY
Admission medication history interview status for this patient is complete. See [facility] admission navigator for allergy information, prior to admission medications and immunization status.     Medication history interview source(s):Patient, [facility] mail order pharmacy  Medication history resources (including written lists, pill bottles, clinic record): Care Everywhere records.  Primary pharmacy: [facility] mail order & [facility] in [place]    Changes made to PTA medication list:  Added:  MVI, Dose for Xanax  Deleted:  Fibercon, Zantaz & Miralax (duplicate)  Changed:  Effexor 37.5mg tab BID ---> Effexor XR 37.5mg caps daily    Actions taken by pharmacist (provider contacted, etc): Called [facility] for get info on Zanax & Effexor XR.     Additional medication history information:None    Medication reconciliation/reorder completed by provider prior to medication history? No    Do you take OTC medications (eg tylenol, ibuprofen, fish oil, eye/ear drops, etc)?  Yes, as listed.    For patients on insulin therapy:  No     Prior to Admission medications    Medication Sig Last Dose Taking? Auth Provider   ALPRAZolam (XANAX) 0.5 MG tablet Take 0.5 mg by mouth At Bedtime 10/30/2018 at pm Yes Unknown, Entered By History   hydrocortisone (PROCTOSOL HC) 2.5 % cream Place rectally as needed for hemorrhoids prn Yes Unknown, Entered By History   lovastatin (MEVACOR) 40 MG tablet Take 40 mg by mouth every evening  10/30/2018 at pm Yes    meclizine (ANTIVERT) 25 MG tablet Take 1 tablet (25 mg) by mouth every 6 hours as needed for dizziness prn  James Talavera MD   Multiple Vitamins-Minerals (MULTIVITAMIN ADULT PO) Take 1 tablet by mouth daily 10/31/2018 at am Yes Unknown, Entered By History   polyethylene glycol (MIRALAX/GLYCOLAX) Packet Take 1 packet by mouth daily as needed for constipation prn Yes Unknown, Entered By History   venlafaxine (EFFEXOR-XR) 37.5 MG 24 hr capsule Take 37.5 mg by mouth every morning 10/31/2018 at am Yes  Unknown, Entered By History

## 2018-10-31 NOTE — H&P
Admitted:     10/31/2018      CHIEF COMPLAINT:  Right hip pain, status post fall.      HISTORY OF PRESENT ILLNESS:  Josie Childs is a 58-year-old female with a history of fibromyalgia, anxiety, gastroesophageal reflux disease, hypercholesterolemia, who presented to the emergency room with right hip pain after she fell down.  This afternoon while she was holding her [id number] grandson, she tripped over the rug and landed on her right hip side, and sustained trauma.  She denied hitting her head or loss of consciousness.  Her grandson was not hurt.  She started to have significant pain when she tried to get up.  She thought she had a hip dislocation and the EMS was called and brought her to the emergency room.  The patient denied any headache, no blurring of vision, no lower extremity weakness or tingling sensation.        In the emergency room, she was evaluated.  She had an x-ray done which showed right femoral neck fracture.  The ED physician, Dr. Trejo then discussed with her he will call the orthopedic surgeon on-call and is planning to have surgical repair tomorrow.      PAST MEDICAL HISTORY:   1.  Gastroesophageal reflux disease.   2.  Fibromyalgia.   3.  Irritable bowel syndrome.   4.  Anxiety.   5.  Depression.   6.  Vertigo.      PAST SURGICAL HISTORY:  Tonsillectomy and adenoidectomy, cholecystectomy, and colonoscopy.      FAMILY HISTORY:  Significant for Parkinson's disease, diabetes, alcohol abuse, osteoporosis, and breast cancer.      SOCIAL HISTORY:  She does not smoke.  She does not drink alcohol.  She does not use illicit drugs.  She lives with her .  She is pretty active, walks 3-5 miles every day.  She is in the emergency room with her .      REVIEW OF SYSTEMS:  Ten points reviewed, all are negative except those mentioned in the history of present illness.      HOME MEDICATIONS:  Needs to be reviewed, but includes Effexor, alprazolam, MiraLAX, and Zantac.      ALLERGIES:   CARAFATE, NEXIUM, TEGRETOL.       PHYSICAL EXAMINATION:   GENERAL:  The patient is awake, alert, oriented, comfortable, not in any form of distress.   VITAL SIGNS:  Blood pressure 155/83,  pulse rate 85, temperature 97.7, oxygen saturation 97%.   HEENT:  Pink and nonicteric.  Extraocular muscle movement intact.   NECK:  Supple, no JVD, no thyromegaly.   CHEST:  Good air entry bilaterally.  No wheezing, crackles or rales.   CARDIOVASCULAR:  S1, S2 were heard.  No gallop or murmur.   ABDOMEN:  Soft, nontender, nondistended, positive bowel sounds.   EXTREMITIES:  Right hip tenderness with limited range of motion, no edema, cyanosis or clubbing.   NEUROLOGIC:  No focal neurologic deficits.  Sensation is intact.  She moves her left leg.  The right leg has significant pain with movement.   PSYCHIATRIC:  Normal mood and affect, looks slightly anxious, otherwise no agitation.      DIAGNOSTIC TESTS OF INTEREST:  Electrolytes all normal, creatinine 0.762, BUN 16, calcium 8.5, glucose 93.  WBC 11.8, hemoglobin 12.7, platelets 255.  X-ray of the femur and pelvis showed right femoral neck fracture.      ASSESSMENT AND PLAN:  Ms. Childs is a 58-year-old female with history of fibromyalgia, GERD, and hyperlipidemia, who presented to the emergency room with right hip pain after a mechanical fall, tripping over her dog, and sustained trauma.  She was found to have right femoral neck fracture, and is being admitted to the hospital.      1.  Mechanical fall.   2.  Right femoral neck fracture.   3.  Anxiety and insomnia.      PLAN:  The patient is being admitted as an inpatient.  I expect a 2-3 night stay in the hospital.  For pain control, the patient will be on oxycodone and also Dilaudid.  I will add Flexeril for muscle spasm.  I will place a Arteaga catheter.  Orthopedic Surgery is consulted and is already aware, per the ED physician.  We will continue most of her home medications when it is reconciled.  For now, I restarted  Effexor 37.5 mg p.o. in the morning, and also she is on alprazolam started at 0.5 mg at bedtime.  I discussed with the patient, and also with her  at bedside.  All of their questions and concerns were addressed.  The patient will be n.p.o. after midnight and we did start her on IV fluids with D5 KCl at 75 ml at midnight.  She is full code.        The patient is medically stable to undergo surgery.  There is no modifiable risk.  I will get her INR checked in the morning prior to the procedure.             KEYONA MCKEON MD             D: 10/31/2018   T: 10/31/2018   MT: TOAN      Name:     BISHNU NEWTON   MRN:      [id number]        Account:      [id number]   :      1960        Admitted:     10/31/2018                   Document: [id number]

## 2018-10-31 NOTE — IP AVS SNAPSHOT
MRN:[id number]                      After Visit Summary   10/31/2018    Josie Childs    MRN: [id number]           Thank you!     Thank you for choosing [facility] for your care. Our goal is always to provide you with excellent care. Hearing back from our patients is one way we can continue to improve our services. Please take a few minutes to complete the written survey that you may receive in the mail after you visit with us. Thank you!        Patient Information     Date Of Birth          1960        Designated Caregiver       Most Recent Value    Caregiver    Will someone help with your care after discharge? no      About your hospital stay     You were admitted on:  October 31, 2018 You last received care in the:  [place] [facility] [address] [facility]    You were discharged on:  November 3, 2018        Reason for your hospital stay       Right direct anterior total hip arthroplasty - done due to femoral neck fracture                  Who to Call     For medical emergencies, please call 911.  For non-urgent questions about your medical care, please call your primary care provider or clinic, [phone]  For questions related to your surgery, please call your surgery clinic        Attending Provider     Provider Specialty    Kelly Gardner MD Internal Medicine    [facility]       Primary Care Provider Office Phone # Fax #    [facility] [phone]       When to contact your care team       Call Dr. Chavez's team if you have any of the following: temperature greater than 101.0,  increased shortness of breath, increased drainage, increased swelling or increased pain.    [phone]                  After Care Instructions     Activity       Your activity upon discharge: activity as tolerated, ambulate in house, no lifting, driving, or strenuous exercise until discussed with Dr. Chavez and no driving while on  "analgesics            Diet       Follow this diet upon discharge: Orders Placed This Encounter      Advance Diet as Tolerated: Regular Diet Adult            Wound care and dressings       Instructions to care for your wound at home: as directed, ice to area for comfort, keep wound clean and dry and remove dressing at POD #7.  Do not soak until further discussed and able to be seen                  Follow-up Appointments     Follow-up and recommended labs and tests        Follow up with Dr. Chavez , at (location with clinic name or city) [facility] ([place] or [place], within 2-3 weeks  to evaluate after surgery. No follow up labs or test are needed.  Call [facility] for appointment [phone]                  Pending Results     No orders found from 10/29/2018 to 11/1/2018.            Statement of Approval     Ordered          11/03/18 1120  I have reviewed and agree with all the recommendations and orders detailed in this document.  EFFECTIVE NOW     Approved and electronically signed by:  Eren Cancino MD             Admission Information     Date & Time Provider Department Dept. Phone    10/31/2018 Lizandro Pollard MD [place] [address] [facility] [phone]      Your Vitals Were     Blood Pressure Pulse Temperature Respirations Weight Pulse Oximetry    109/65 (BP Location: Right arm) 93 98.6  F (37  C) (Oral) 16 70.6 kg (155 lb 10.3 oz) 94%    BMI (Body Mass Index)                   25.9 kg/m2           [employer] Information     [employer] lets you send messages to your doctor, view your test results, renew your prescriptions, schedule appointments and more. To sign up, go to [email or web address] . Click on \"Log in\" on the left side of the screen, which will take you to the Welcome page. Then click on \"Sign up Now\" on the right side of the page.     You will be asked to enter the access code listed below, as well as some personal information. Please follow the " directions to create your username and password.     Your access code is: [email or web address]  Expires: 2019  5:01 PM     Your access code will  in 90 days. If you need help or a new code, please call your [facility] clinic or [phone].        Care EveryWhere ID     This is your Care EveryWhere ID. This could be used by other organizations to access your [facility] medical records  [id number]        Equal Access to Services     [facility] CountyGIOVANNA : Hadii destiny ku hadasho Soomaali, waaxda luqadaha, qaybta kaalmada adeegyada, waxay azarin hayjimenezn yoly bradymigdaliaangelo vasquez . So [facility] [phone].    ATENCIÓN: Si habla español, tiene a gustafson disposición servicios gratuitos de asistencia lingüística. [facility] [phone].    We comply with applicable federal civil rights laws and [place] laws. We do not discriminate on the basis of race, color, national origin, age, disability, sex, sexual orientation, or gender identity.               Review of your medicines      START taking        Dose / Directions    acetaminophen 325 MG tablet   Commonly known as:  TYLENOL        Dose:  650 mg   Take 2 tablets (650 mg) by mouth every 6 hours as needed for mild pain   Quantity:  40 tablet   Refills:  0       aspirin 325 MG EC tablet   Indication:  VTE Prophylaxis        Dose:  325 mg   Take 1 tablet (325 mg) by mouth daily   Quantity:  30 tablet   Refills:  0       hydrOXYzine 25 MG tablet   Commonly known as:  ATARAX        Dose:  25 mg   Take 1 tablet (25 mg) by mouth every 6 hours as needed for itching   Quantity:  30 tablet   Refills:  0       methocarbamol 500 MG tablet   Commonly known as:  ROBAXIN        Dose:  500 mg   Take 1 tablet (500 mg) by mouth 4 times daily as needed for muscle spasms   Quantity:  60 tablet   Refills:  0       ondansetron 4 MG ODT tab   Commonly known as:  ZOFRAN-ODT        Dose:  4 mg   Take 1 tablet (4 mg) by mouth every 6 hours as needed for nausea or vomiting   Quantity:  10 tablet   Refills:  0        oxyCODONE IR 5 MG tablet   Commonly known as:  ROXICODONE        Dose:  5 mg   Take 1 tablet (5 mg) by mouth every 4 hours as needed for moderate to severe pain   Quantity:  30 tablet   Refills:  0       senna-docusate 8.6-50 MG per tablet   Commonly known as:  SENOKOT-S;PERICOLACE        Dose:  2 tablet   Take 2 tablets by mouth 2 times daily   Quantity:  30 tablet   Refills:  0         CONTINUE these medicines which have NOT CHANGED        Dose / Directions    ALPRAZolam 0.5 MG tablet   Commonly known as:  XANAX        Dose:  0.5 mg   Take 0.5 mg by mouth At Bedtime   Refills:  0       lovastatin 40 MG tablet   Commonly known as:  MEVACOR   Used for:  Hyperlipidemia LDL goal < 130, CARDIOVASCULAR SCREENING; LDL GOAL LESS THAN 130        Dose:  40 mg   Take 40 mg by mouth every evening   Quantity:  90 tablet   Refills:  3       meclizine 25 MG tablet   Commonly known as:  ANTIVERT   Notes to Patient:  Not given in hospital, resume as per home        Dose:  25 mg   Take 1 tablet (25 mg) by mouth every 6 hours as needed for dizziness   Quantity:  30 tablet   Refills:  1       MULTIVITAMIN ADULT PO   Notes to Patient:  Not given in hospital, resume as per home        Dose:  1 tablet   Take 1 tablet by mouth daily   Refills:  0       polyethylene glycol Packet   Commonly known as:  MIRALAX/GLYCOLAX   Notes to Patient:  Not given in hospital, resume as per home        Dose:  1 packet   Take 1 packet by mouth daily as needed for constipation   Refills:  0       PROCTOSOL HC 2.5 % cream   Generic drug:  hydrocortisone   Notes to Patient:  Not given in hospital, resume as per home        Place rectally as needed for hemorrhoids   Refills:  0       venlafaxine 37.5 MG 24 hr capsule   Commonly known as:  EFFEXOR-XR        Dose:  37.5 mg   Take 37.5 mg by mouth every morning   Refills:  0            Where to get your medicines      These medications were sent to [facility] [place], [place] - [address] [place] [address]     Phone:  [phone]     methocarbamol 500 MG tablet         Some of these will need a paper prescription and others can be bought over the counter. Ask your nurse if you have questions.     Bring a paper prescription for each of these medications     acetaminophen 325 MG tablet    aspirin 325 MG EC tablet    hydrOXYzine 25 MG tablet    ondansetron 4 MG ODT tab    oxyCODONE IR 5 MG tablet    senna-docusate 8.6-50 MG per tablet                Protect others around you: Learn how to safely use, store and throw away your medicines at www.disposemymeds.org.        Information about OPIOIDS     PRESCRIPTION OPIOIDS: WHAT YOU NEED TO KNOW   We gave you an opioid (narcotic) pain medicine. It is important to manage your pain, but opioids are not always the best choice. You should first try all the other options your care team gave you. Take this medicine for as short a time (and as few doses) as possible.    Some activities can increase your pain, such as bandage changes or therapy sessions. It may help to take your pain medicine 30 to 60 minutes before these activities. Reduce your stress by getting enough sleep, working on hobbies you enjoy and practicing relaxation or meditation. Talk to your care team about ways to manage your pain beyond prescription opioids.    These medicines have risks:    DO NOT drive when on new or higher doses of pain medicine. These medicines can affect your alertness and reaction times, and you could be arrested for driving under the influence (DUI). If you need to use opioids long-term, talk to your care team about driving.    DO NOT operate heavy machinery    DO NOT do any other dangerous activities while taking these medicines.    DO NOT drink any alcohol while taking these medicines.     If the opioid prescribed includes acetaminophen, DO NOT take with any other medicines that contain acetaminophen. Read all labels carefully. Look for the word   acetaminophen  or  Tylenol.  Ask your pharmacist if you have questions or are unsure.    You can get addicted to pain medicines, especially if you have a history of addiction (chemical, alcohol or substance dependence). Talk to your care team about ways to reduce this risk.    All opioids tend to cause constipation. Drink plenty of water and eat foods that have a lot of fiber, such as fruits, vegetables, prune juice, apple juice and high-fiber cereal. Take a laxative (Miralax, milk of magnesia, Colace, Senna) if you don t move your bowels at least every other day. Other side effects include upset stomach, sleepiness, dizziness, throwing up, tolerance (needing more of the medicine to have the same effect), physical dependence and slowed breathing.    Store your pills in a secure place, locked if possible. We will not replace any lost or stolen medicine. If you don t finish your medicine, please throw away (dispose) as directed by your pharmacist. The [place] Pollution Control Agency has more information about safe disposal: https://www.pca.[email or web address].[place].us/living-green/managing-unwanted-medications             Medication List: This is a list of all your medications and when to take them. Check marks below indicate your daily home schedule. Keep this list as a reference.      Medications           Morning Afternoon Evening Bedtime As Needed    acetaminophen 325 MG tablet   Commonly known as:  TYLENOL   Take 2 tablets (650 mg) by mouth every 6 hours as needed for mild pain   Last time this was given:  975 mg on 11/3/2018  1:29 PM   Next Dose Due:  Today 11/3 at 7:30pm if needed                                   ALPRAZolam 0.5 MG tablet   Commonly known as:  XANAX   Take 0.5 mg by mouth At Bedtime   Last time this was given:  0.5 mg on 11/2/2018  8:27 PM   Next Dose Due:  Today 11/3 at bedtime                                   aspirin 325 MG EC tablet   Take 1 tablet (325 mg) by mouth daily   Last time this was given:   325 mg on 11/3/2018  9:08 AM   Next Dose Due:  Tomorrow 11/4 in am                                   hydrOXYzine 25 MG tablet   Commonly known as:  ATARAX   Take 1 tablet (25 mg) by mouth every 6 hours as needed for itching   Last time this was given:  25 mg on 11/2/2018 11:29 AM   Next Dose Due:  Anytime as needed                                   lovastatin 40 MG tablet   Commonly known as:  MEVACOR   Take 40 mg by mouth every evening   Last time this was given:  40 mg on 11/2/2018  8:27 PM   Next Dose Due:  Today 11/3 in pm                                   meclizine 25 MG tablet   Commonly known as:  ANTIVERT   Take 1 tablet (25 mg) by mouth every 6 hours as needed for dizziness   Notes to Patient:  Not given in hospital, resume as per home                                   methocarbamol 500 MG tablet   Commonly known as:  ROBAXIN   Take 1 tablet (500 mg) by mouth 4 times daily as needed for muscle spasms   Next Dose Due:  Anytime as needed                                   MULTIVITAMIN ADULT PO   Take 1 tablet by mouth daily   Notes to Patient:  Not given in hospital, resume as per home                                ondansetron 4 MG ODT tab   Commonly known as:  ZOFRAN-ODT   Take 1 tablet (4 mg) by mouth every 6 hours as needed for nausea or vomiting   Next Dose Due:  Anytime as needed                                   oxyCODONE IR 5 MG tablet   Commonly known as:  ROXICODONE   Take 1 tablet (5 mg) by mouth every 4 hours as needed for moderate to severe pain   Last time this was given:  10 mg on 11/3/2018  1:29 PM   Next Dose Due:  Today 11/3 at 5:30pm if needed                                   polyethylene glycol Packet   Commonly known as:  MIRALAX/GLYCOLAX   Take 1 packet by mouth daily as needed for constipation   Notes to Patient:  Not given in hospital, resume as per home                                   PROCTOSOL HC 2.5 % cream   Place rectally as needed for hemorrhoids   Generic drug:  hydrocortisone    Notes to Patient:  Not given in hospital, resume as per home                                   senna-docusate 8.6-50 MG per tablet   Commonly known as:  SENOKOT-S;PERICOLACE   Take 2 tablets by mouth 2 times daily   Last time this was given:  2 tablets on 11/3/2018  9:08 AM   Next Dose Due:  Today 11/3 in pm                                      venlafaxine 37.5 MG 24 hr capsule   Commonly known as:  EFFEXOR-XR   Take 37.5 mg by mouth every morning   Last time this was given:  37.5 mg on 11/3/2018  9:08 AM   Next Dose Due:  Tomorrow 11/4 in am

## 2018-10-31 NOTE — ED PROVIDER NOTES
History     Chief Complaint:  Right hip pain    The history is provided by the patient and the EMS personnel.      Josie Childs is a 58 year old female with a history of GERD and fibromyalgia who presents to the emergency department via EMS for evaluation of right hip pain. Earlier this afternoon while holding her grandson, the patient reports she tripped over her dog, falling on her right hip. She denies hitting her head or loss of consciousness. She reports the onset of pain and thought she had a possible hip dislocation. This prompted her to call EMS to seek evaluation here in the emergency department. EMS gave her 50 mg fentanyl. The patient does not have a prior hip surgeries or replacements.    Here, the patient states her pain is in the right thigh. She notes she can push on her right hip without pain but she had pain in her leg when she tries to move it. She denies numbness and tingling. She denies other injuries. The patient is otherwise healthy. Her last PO was at 1100 this morning.     Allergies:  Carafate  Nexium  Tegretol     Medications:    Alprazole  Effexor  Lovastatin  Hydrocortisone  Miralax  zantac  Polycarbophil    Past Medical History:    Hypercholesteremia  GERD   Fibromyalgia   Vitamin D deficiency  IBS  Anxiety  Hyperlipidemia   Depression  Vertigo     Past Surgical History:    No prior right hip surgery  Tonsillectomy and adenoidectomy  Cholecystectomy  Colonoscopy  The patient does not have any pertinent past surgical history.    Family History:    Parkinson's disease  diabetes mellitus  Alcohol/drug use  Osteoporosis  Breast cancer  Fibromyalgia  Lung disease  Anxiety  Stroke  Hyperlipidemia   Brain cancer    Social History:  Negative for tobacco use.  Negative for alcohol use.  Patient presents with her   Marital Status:        Review of Systems   Musculoskeletal:        Positive for right hip pain   Skin: Negative for wound.   Neurological: Positive for weakness.  "Negative for numbness.   All other systems reviewed and are negative.    Physical Exam     Patient Vitals for the past 24 hrs:   BP Temp Temp src Pulse Resp SpO2 Height Weight   10/31/18 1615 135/83 - - - - 97 % - -   10/31/18 1600 147/88 - - - - 96 % - -   10/31/18 1545 140/75 - - - - 98 % - -   10/31/18 1530 - - - - - 100 % - -   10/31/18 1500 139/75 - - - - 98 % - -   10/31/18 1440 139/80 97.7  F (36.5  C) Oral 85 20 98 % 1.651 m (5' 5\") 70.3 kg (155 lb)       Physical Exam  General: Patient is alert and interactive when I enter the room  Head:  The scalp, face, and head appear normal  Eyes:  Conjunctivae are normal  ENT:    The nose is normal    Pinnae are normal    External acoustic canals are normal  Neck:  Trachea midline  CV:  Pulses are normal  Resp:  No respiratory distress   Abdomen:      Soft, non-tender, non-distended  Musc:  Normal muscular tone    No major joint effusions    Significant tenderness to right hu and femur, right lower extremity shortened, very limited ROM    2+ DP pulse, sensation intact  Skin:  No rash or lesions noted  Neuro:  Speech is normal and fluent. Face is symmetric.     Moving all extremities well.   Psych: Awake. Alert.  Normal affect.  Appropriate interactions.      Emergency Department Course   ECG:  Indication: Right hip fracture  Time: 1622  Vent. Rate 70 bpm. WY interval 158. QRS duration 70. QT/QTc 442/477. P-R-T axis 66 75 72. Normal sinus rhythm. Normal ECG. Read time: 1632.     Imaging:  Radiographic findings were communicated with the patient and family who voiced understanding of the findings.    XR Femur, Right, 2 views:   Right femoral neck fracture. As per radiology.     XR Pelvis, 1/2 view:  Right femoral neck fracture. As per radiology.     Laboratory:  CBC: WBC: 11.8 (H), HGB: 12.7, PLT: 255  BMP: All WNL (Creatinine: 0.76)    Interventions:  1424 Fentanyl 100 mcg IV  1542 NS 1L IV  1542 Dilaudid, 0.5 mg, IV injection  1652 NS 1L IV  1653 Dilaudid, 0.5 mg, IV " injection  1650 Flexeril 10 mg tablet PO  1652 NS 1L IV  1731 Dilaudid, 0.5 mg, IV injection   Ativan 0.5 mg IV    Emergency Department Course:  1446 Nursing notes and vitals reviewed.  I performed an exam of the patient as documented above.     IV inserted. Medicine administered as documented above. Blood drawn. This was sent to the lab for further testing, results above.    The patient was sent for a right pelvis xray and right femur xray while in the emergency department, findings above.     1530 I rechecked the patient and discussed the results of her workup thus far.     1535 I consulted with KAT Bowen, Orthopedics, regarding the patient's history and presentation here in the emergency department.    1554  I consulted with Dr. Mcnamara of the hospitalist services. They are in agreement to accept the patient for admission.    EKG obtained in the ED, see results above.     1730 I consulted with KAT Bowen, Orthopedics. She updated me that there is not OR time here at [facility] tomorrow, and therefore, the patient needs to be transferred to [facility].    Findings and plan explained to the Patient and spouse. Patient will be transferred to [facility] via EMS. Discussed the case with Dr. Gardner, who will admit the patient to a monitored bed for further monitoring, evaluation, and treatment.  Impression & Plan    Medical Decision Making:  Josie Childs is a 58 year old female who presents for evaluation of right hip pain after tripping and falling. CMS is intact distally in the extremity.  Xrays reveal a fracture of the hip that will need orthopedic consultation and likely surgery.  The patients head to toe trauma exam is otherwise normal at this time.  Will admit to medicine for further cares and ortho consultation.  Pain control achieved.     While the patient was waiting to be admitted, KAT Bowen, Orthopedics, called, updating me on how the patient would now need to be  transferred to [facility] because there is not OR time here at [facility] tomorrow.  Patient was transferred to [facility] to an ortho bed.     Critical Care time:  none    Diagnosis:    ICD-10-CM    1. Closed fracture of neck of right femur, initial encounter (H) S72.001A        Disposition:  Admitted to Dr. Kendra Parks Disclosure:   I, Christen Knutson, am serving as a scribe on 10/31/2018 at 2:46 PM to personally document services performed by Renita Trejo MD based on my observations and the provider's statements to me.     Christen Knutson  10/31/2018   [facility] EMERGENCY DEPARTMENT       Renita Trejo MD  11/01/18 0948

## 2018-10-31 NOTE — ED NOTES
"[facility]  ED Nurse Handoff Report    Josie Childs is a 58 year old female   ED Chief complaint: No chief complaint on file.  . ED Diagnosis:   Final diagnoses:   Closed fracture of neck of right femur, initial encounter (H)     Allergies:   Allergies   Allergen Reactions     Carafate [Sucralfate] Hives     Nexium [Esomeprazole Magnesium Trihydrate] Hives     Tegretol [Carbamazepine]        Code Status: Full Code  Activity level - Baseline/Home:  Independent. Activity Level - Current:   Bedrest due to fracture. Lift room needed: No. Bariatric: No   Needed: No   Isolation: No. Infection: Not Applicable.     Vital Signs:   Vitals:    10/31/18 1440 10/31/18 1500   BP: 139/80 139/75   BP Location: Right arm    Pulse: 85    Resp: 20    Temp: 97.7  F (36.5  C)    TempSrc: Oral    SpO2: 98% 98%   Weight: 70.3 kg (155 lb)    Height: 1.651 m (5' 5\")        Cardiac Rhythm:  ,      Pain level: 0-10 Pain Scale: 10  Patient confused: No. Patient Falls Risk: Yes.   Elimination Status: Unable to void   Patient Report - Initial Complaint: fall. Focused Assessment: right hip   Tests Performed:   Labs Ordered and Resulted from Time of ED Arrival Up to the Time of Departure from the ED   BASIC METABOLIC PANEL   CBC WITH PLATELETS DIFFERENTIAL   . Abnormal Results: hip[ xray   Treatments provided: iv fluids pain control  Family Comments:  at bedside  OBS brochure/video discussed/provided to patient:  N/A  ED Medications:   Medications   0.9% sodium chloride BOLUS (1,000 mLs Intravenous New Bag 10/31/18 1542)     Followed by   sodium chloride 0.9% infusion (not administered)   fentaNYL (PF) (SUBLIMAZE) injection 50 mcg (not administered)   HYDROmorphone (PF) (DILAUDID) injection 0.5 mg (0.5 mg Intravenous Given 10/31/18 1542)   fentaNYL (PF) (SUBLIMAZE) 100 MCG/2ML injection (100 mcg  Given 10/31/18 1424)     Drips infusing:  Yes  For the majority of the shift, the patient's behavior Green. " Interventions performed were pain control.     Severe Sepsis OR Septic Shock Diagnosis Present: No      ED Nurse Name/Phone Number: Heather Munoz,   3:59 PM

## 2018-10-31 NOTE — ED NOTES
Bed: ED18  Expected date: 10/31/18  Expected time: 2:11 PM  Means of arrival: Ambulance  Comments:  Errol Estrella

## 2018-10-31 NOTE — PROGRESS NOTES
Patient in ED with right femoral neck fracture    Due to C-arm constraints and scheduling of OR unable to have OR at [facility] tomorrow, discussed with Dr. Chavez and would like patient transferred to [facility] so that we may do surgery tomorrow    Patient scheduled for 1pm tomorrow for right direct anterior hip arthroplasty    NPO after midnight  Bed rest  Type and Screen  Pain medication as needed  Vit D Level  Hospitalist for pre-op optimization    Please call if any issues  Christen Clark PAC  [phone]

## 2018-10-31 NOTE — Clinical Note
Admitting Physician: KEYONA MCKEON [[id number]]   [id number]   Bed Type: Adult Med/Surg [46]   Special needs: Fall Risk [8]   Bed request comments: BED REQUEST IN AT 0830

## 2018-11-01 ENCOUNTER — APPOINTMENT (OUTPATIENT)
Dept: GENERAL RADIOLOGY | Facility: CLINIC | Age: 58
DRG: 470 | End: 2018-11-01
Attending: HOSPITALIST
Payer: COMMERCIAL

## 2018-11-01 ENCOUNTER — ANESTHESIA EVENT (OUTPATIENT)
Dept: SURGERY | Facility: CLINIC | Age: 58
DRG: 470 | End: 2018-11-01
Payer: COMMERCIAL

## 2018-11-01 ENCOUNTER — APPOINTMENT (OUTPATIENT)
Dept: GENERAL RADIOLOGY | Facility: CLINIC | Age: 58
DRG: 470 | End: 2018-11-01
Attending: PHYSICIAN ASSISTANT
Payer: COMMERCIAL

## 2018-11-01 ENCOUNTER — ANESTHESIA (OUTPATIENT)
Dept: SURGERY | Facility: CLINIC | Age: 58
DRG: 470 | End: 2018-11-01
Payer: COMMERCIAL

## 2018-11-01 LAB
ABO + RH BLD: NORMAL
ABO + RH BLD: NORMAL
ANION GAP SERPL CALCULATED.3IONS-SCNC: 6 MMOL/L (ref 3–14)
BLD GP AB SCN SERPL QL: NORMAL
BLOOD BANK CMNT PATIENT-IMP: NORMAL
BUN SERPL-MCNC: 8 MG/DL (ref 7–30)
CALCIUM SERPL-MCNC: 7.9 MG/DL (ref 8.5–10.1)
CHLORIDE SERPL-SCNC: 109 MMOL/L (ref 94–109)
CO2 SERPL-SCNC: 24 MMOL/L (ref 20–32)
CREAT SERPL-MCNC: 0.67 MG/DL (ref 0.52–1.04)
DEPRECATED CALCIDIOL+CALCIFEROL SERPL-MC: 23 UG/L (ref 20–75)
GFR SERPL CREATININE-BSD FRML MDRD: >90 ML/MIN/1.7M2
GLUCOSE SERPL-MCNC: 101 MG/DL (ref 70–99)
POTASSIUM SERPL-SCNC: 4 MMOL/L (ref 3.4–5.3)
SODIUM SERPL-SCNC: 139 MMOL/L (ref 133–144)
SPECIMEN EXP DATE BLD: NORMAL

## 2018-11-01 PROCEDURE — 40000986 XR PELVIS AD HIP PORTABLE RIGHT 1 VIEW

## 2018-11-01 PROCEDURE — 25000125 ZZHC RX 250: Performed by: PHYSICIAN ASSISTANT

## 2018-11-01 PROCEDURE — 25000128 H RX IP 250 OP 636: Performed by: PHYSICIAN ASSISTANT

## 2018-11-01 PROCEDURE — 25000128 H RX IP 250 OP 636: Performed by: ANESTHESIOLOGY

## 2018-11-01 PROCEDURE — 86900 BLOOD TYPING SEROLOGIC ABO: CPT | Performed by: ANESTHESIOLOGY

## 2018-11-01 PROCEDURE — 25000132 ZZH RX MED GY IP 250 OP 250 PS 637: Performed by: HOSPITALIST

## 2018-11-01 PROCEDURE — 25000128 H RX IP 250 OP 636: Performed by: NURSE ANESTHETIST, CERTIFIED REGISTERED

## 2018-11-01 PROCEDURE — C1776 JOINT DEVICE (IMPLANTABLE): HCPCS | Performed by: ORTHOPAEDIC SURGERY

## 2018-11-01 PROCEDURE — 0SR904A REPLACEMENT OF RIGHT HIP JOINT WITH CERAMIC ON POLYETHYLENE SYNTHETIC SUBSTITUTE, UNCEMENTED, OPEN APPROACH: ICD-10-PCS | Performed by: ORTHOPAEDIC SURGERY

## 2018-11-01 PROCEDURE — 25000128 H RX IP 250 OP 636

## 2018-11-01 PROCEDURE — 37000008 ZZH ANESTHESIA TECHNICAL FEE, 1ST 30 MIN: Performed by: ORTHOPAEDIC SURGERY

## 2018-11-01 PROCEDURE — 80048 BASIC METABOLIC PNL TOTAL CA: CPT | Performed by: HOSPITALIST

## 2018-11-01 PROCEDURE — 12000007 ZZH R&B INTERMEDIATE

## 2018-11-01 PROCEDURE — 37000009 ZZH ANESTHESIA TECHNICAL FEE, EACH ADDTL 15 MIN: Performed by: ORTHOPAEDIC SURGERY

## 2018-11-01 PROCEDURE — 36000065 ZZH SURGERY LEVEL 4 W FLUORO 1ST 30 MIN: Performed by: ORTHOPAEDIC SURGERY

## 2018-11-01 PROCEDURE — 25000125 ZZHC RX 250: Performed by: NURSE ANESTHETIST, CERTIFIED REGISTERED

## 2018-11-01 PROCEDURE — 25000132 ZZH RX MED GY IP 250 OP 250 PS 637: Performed by: PHYSICIAN ASSISTANT

## 2018-11-01 PROCEDURE — 27210794 ZZH OR GENERAL SUPPLY STERILE: Performed by: ORTHOPAEDIC SURGERY

## 2018-11-01 PROCEDURE — 86901 BLOOD TYPING SEROLOGIC RH(D): CPT | Performed by: ANESTHESIOLOGY

## 2018-11-01 PROCEDURE — 40000170 ZZH STATISTIC PRE-PROCEDURE ASSESSMENT II: Performed by: ORTHOPAEDIC SURGERY

## 2018-11-01 PROCEDURE — 25000128 H RX IP 250 OP 636: Performed by: ORTHOPAEDIC SURGERY

## 2018-11-01 PROCEDURE — 99231 SBSQ HOSP IP/OBS SF/LOW 25: CPT | Performed by: INTERNAL MEDICINE

## 2018-11-01 PROCEDURE — 25000566 ZZH SEVOFLURANE, EA 15 MIN: Performed by: ORTHOPAEDIC SURGERY

## 2018-11-01 PROCEDURE — 71000012 ZZH RECOVERY PHASE 1 LEVEL 1 FIRST HR: Performed by: ORTHOPAEDIC SURGERY

## 2018-11-01 PROCEDURE — 36415 COLL VENOUS BLD VENIPUNCTURE: CPT | Performed by: HOSPITALIST

## 2018-11-01 PROCEDURE — 40000277 XR SURGERY CARM FLUORO LESS THAN 5 MIN W STILLS

## 2018-11-01 PROCEDURE — 25800025 ZZH RX 258: Performed by: ORTHOPAEDIC SURGERY

## 2018-11-01 PROCEDURE — 25000125 ZZHC RX 250: Performed by: ORTHOPAEDIC SURGERY

## 2018-11-01 PROCEDURE — 82306 VITAMIN D 25 HYDROXY: CPT | Performed by: HOSPITALIST

## 2018-11-01 PROCEDURE — 86850 RBC ANTIBODY SCREEN: CPT | Performed by: ANESTHESIOLOGY

## 2018-11-01 PROCEDURE — 36000063 ZZH SURGERY LEVEL 4 EA 15 ADDTL MIN: Performed by: ORTHOPAEDIC SURGERY

## 2018-11-01 DEVICE — IMP INSERT STRK TRIDENT X3 POLY 36MM 0DEG SZ D 623-00-36D: Type: IMPLANTABLE DEVICE | Site: HIP | Status: FUNCTIONAL

## 2018-11-01 DEVICE — IMP STEM FEMORAL HIP STRK ACCOLADE II 132DEG SZ 5 6720-0535: Type: IMPLANTABLE DEVICE | Site: HIP | Status: FUNCTIONAL

## 2018-11-01 DEVICE — IMP HEAD FEMORAL STRK BIOLOX DELTA CERAMIC 36MM -5MM: Type: IMPLANTABLE DEVICE | Site: HIP | Status: FUNCTIONAL

## 2018-11-01 DEVICE — IMPLANTABLE DEVICE: Type: IMPLANTABLE DEVICE | Site: HIP | Status: FUNCTIONAL

## 2018-11-01 RX ORDER — HYDROMORPHONE HYDROCHLORIDE 1 MG/ML
.3-.5 INJECTION, SOLUTION INTRAMUSCULAR; INTRAVENOUS; SUBCUTANEOUS
Status: DISCONTINUED | OUTPATIENT
Start: 2018-11-01 | End: 2018-11-03 | Stop reason: HOSPADM

## 2018-11-01 RX ORDER — AMOXICILLIN 250 MG
2 CAPSULE ORAL 2 TIMES DAILY
Status: DISCONTINUED | OUTPATIENT
Start: 2018-11-01 | End: 2018-11-03 | Stop reason: HOSPADM

## 2018-11-01 RX ORDER — ONDANSETRON 2 MG/ML
4 INJECTION INTRAMUSCULAR; INTRAVENOUS EVERY 6 HOURS PRN
Status: DISCONTINUED | OUTPATIENT
Start: 2018-11-01 | End: 2018-11-03 | Stop reason: HOSPADM

## 2018-11-01 RX ORDER — LIDOCAINE 40 MG/G
CREAM TOPICAL
Status: DISCONTINUED | OUTPATIENT
Start: 2018-11-01 | End: 2018-11-03 | Stop reason: HOSPADM

## 2018-11-01 RX ORDER — NALOXONE HYDROCHLORIDE 0.4 MG/ML
.1-.4 INJECTION, SOLUTION INTRAMUSCULAR; INTRAVENOUS; SUBCUTANEOUS
Status: DISCONTINUED | OUTPATIENT
Start: 2018-11-01 | End: 2018-11-01

## 2018-11-01 RX ORDER — DEXAMETHASONE SODIUM PHOSPHATE 4 MG/ML
INJECTION, SOLUTION INTRA-ARTICULAR; INTRALESIONAL; INTRAMUSCULAR; INTRAVENOUS; SOFT TISSUE PRN
Status: DISCONTINUED | OUTPATIENT
Start: 2018-11-01 | End: 2018-11-01

## 2018-11-01 RX ORDER — METHOCARBAMOL 500 MG/1
500 TABLET, FILM COATED ORAL 4 TIMES DAILY PRN
Status: DISCONTINUED | OUTPATIENT
Start: 2018-11-01 | End: 2018-11-03 | Stop reason: HOSPADM

## 2018-11-01 RX ORDER — ONDANSETRON 2 MG/ML
4 INJECTION INTRAMUSCULAR; INTRAVENOUS EVERY 30 MIN PRN
Status: DISCONTINUED | OUTPATIENT
Start: 2018-11-01 | End: 2018-11-01 | Stop reason: HOSPADM

## 2018-11-01 RX ORDER — ONDANSETRON 4 MG/1
4 TABLET, ORALLY DISINTEGRATING ORAL EVERY 30 MIN PRN
Status: DISCONTINUED | OUTPATIENT
Start: 2018-11-01 | End: 2018-11-01 | Stop reason: HOSPADM

## 2018-11-01 RX ORDER — FENTANYL CITRATE 50 UG/ML
INJECTION, SOLUTION INTRAMUSCULAR; INTRAVENOUS PRN
Status: DISCONTINUED | OUTPATIENT
Start: 2018-11-01 | End: 2018-11-01

## 2018-11-01 RX ORDER — HYDROXYZINE HYDROCHLORIDE 25 MG/1
25 TABLET, FILM COATED ORAL EVERY 6 HOURS PRN
Status: DISCONTINUED | OUTPATIENT
Start: 2018-11-01 | End: 2018-11-03 | Stop reason: HOSPADM

## 2018-11-01 RX ORDER — NALOXONE HYDROCHLORIDE 0.4 MG/ML
.1-.4 INJECTION, SOLUTION INTRAMUSCULAR; INTRAVENOUS; SUBCUTANEOUS
Status: DISCONTINUED | OUTPATIENT
Start: 2018-11-01 | End: 2018-11-03 | Stop reason: HOSPADM

## 2018-11-01 RX ORDER — OXYCODONE HYDROCHLORIDE 5 MG/1
5-10 TABLET ORAL
Status: DISCONTINUED | OUTPATIENT
Start: 2018-11-01 | End: 2018-11-03 | Stop reason: HOSPADM

## 2018-11-01 RX ORDER — FENTANYL CITRATE 50 UG/ML
25-50 INJECTION, SOLUTION INTRAMUSCULAR; INTRAVENOUS
Status: DISCONTINUED | OUTPATIENT
Start: 2018-11-01 | End: 2018-11-01 | Stop reason: HOSPADM

## 2018-11-01 RX ORDER — PROPOFOL 10 MG/ML
INJECTION, EMULSION INTRAVENOUS CONTINUOUS PRN
Status: DISCONTINUED | OUTPATIENT
Start: 2018-11-01 | End: 2018-11-01

## 2018-11-01 RX ORDER — PROPOFOL 10 MG/ML
INJECTION, EMULSION INTRAVENOUS PRN
Status: DISCONTINUED | OUTPATIENT
Start: 2018-11-01 | End: 2018-11-01

## 2018-11-01 RX ORDER — SODIUM CHLORIDE, SODIUM LACTATE, POTASSIUM CHLORIDE, CALCIUM CHLORIDE 600; 310; 30; 20 MG/100ML; MG/100ML; MG/100ML; MG/100ML
INJECTION, SOLUTION INTRAVENOUS CONTINUOUS
Status: DISCONTINUED | OUTPATIENT
Start: 2018-11-01 | End: 2018-11-01 | Stop reason: HOSPADM

## 2018-11-01 RX ORDER — CEFAZOLIN SODIUM 1 G/3ML
1 INJECTION, POWDER, FOR SOLUTION INTRAMUSCULAR; INTRAVENOUS SEE ADMIN INSTRUCTIONS
Status: DISCONTINUED | OUTPATIENT
Start: 2018-11-01 | End: 2018-11-01 | Stop reason: HOSPADM

## 2018-11-01 RX ORDER — ALPRAZOLAM 0.5 MG
0.5 TABLET ORAL
Status: DISCONTINUED | OUTPATIENT
Start: 2018-11-01 | End: 2018-11-03 | Stop reason: HOSPADM

## 2018-11-01 RX ORDER — ACETAMINOPHEN 325 MG/1
975 TABLET ORAL EVERY 8 HOURS
Status: DISCONTINUED | OUTPATIENT
Start: 2018-11-01 | End: 2018-11-03 | Stop reason: HOSPADM

## 2018-11-01 RX ORDER — PROCHLORPERAZINE MALEATE 5 MG
10 TABLET ORAL EVERY 6 HOURS PRN
Status: DISCONTINUED | OUTPATIENT
Start: 2018-11-01 | End: 2018-11-03 | Stop reason: HOSPADM

## 2018-11-01 RX ORDER — KETOROLAC TROMETHAMINE 15 MG/ML
15 INJECTION, SOLUTION INTRAMUSCULAR; INTRAVENOUS EVERY 6 HOURS PRN
Status: DISCONTINUED | OUTPATIENT
Start: 2018-11-01 | End: 2018-11-03 | Stop reason: HOSPADM

## 2018-11-01 RX ORDER — LIDOCAINE HYDROCHLORIDE 20 MG/ML
INJECTION, SOLUTION INFILTRATION; PERINEURAL PRN
Status: DISCONTINUED | OUTPATIENT
Start: 2018-11-01 | End: 2018-11-01

## 2018-11-01 RX ORDER — ACETAMINOPHEN 325 MG/1
650 TABLET ORAL EVERY 4 HOURS PRN
Status: DISCONTINUED | OUTPATIENT
Start: 2018-11-04 | End: 2018-11-03 | Stop reason: HOSPADM

## 2018-11-01 RX ORDER — CEFAZOLIN SODIUM 1 G/3ML
1 INJECTION, POWDER, FOR SOLUTION INTRAMUSCULAR; INTRAVENOUS EVERY 8 HOURS
Status: COMPLETED | OUTPATIENT
Start: 2018-11-02 | End: 2018-11-02

## 2018-11-01 RX ORDER — ONDANSETRON 4 MG/1
4 TABLET, ORALLY DISINTEGRATING ORAL EVERY 6 HOURS PRN
Status: DISCONTINUED | OUTPATIENT
Start: 2018-11-01 | End: 2018-11-03 | Stop reason: HOSPADM

## 2018-11-01 RX ORDER — BUPIVACAINE HYDROCHLORIDE AND EPINEPHRINE 5; 5 MG/ML; UG/ML
INJECTION, SOLUTION PERINEURAL PRN
Status: DISCONTINUED | OUTPATIENT
Start: 2018-11-01 | End: 2018-11-01 | Stop reason: HOSPADM

## 2018-11-01 RX ORDER — AMOXICILLIN 250 MG
1 CAPSULE ORAL 2 TIMES DAILY
Status: DISCONTINUED | OUTPATIENT
Start: 2018-11-01 | End: 2018-11-03 | Stop reason: HOSPADM

## 2018-11-01 RX ORDER — HYDROMORPHONE HYDROCHLORIDE 1 MG/ML
.3-.5 INJECTION, SOLUTION INTRAMUSCULAR; INTRAVENOUS; SUBCUTANEOUS EVERY 5 MIN PRN
Status: DISCONTINUED | OUTPATIENT
Start: 2018-11-01 | End: 2018-11-01 | Stop reason: HOSPADM

## 2018-11-01 RX ORDER — SODIUM CHLORIDE 9 MG/ML
INJECTION, SOLUTION INTRAVENOUS CONTINUOUS
Status: DISCONTINUED | OUTPATIENT
Start: 2018-11-01 | End: 2018-11-03 | Stop reason: HOSPADM

## 2018-11-01 RX ORDER — CEFAZOLIN SODIUM 2 G/100ML
2 INJECTION, SOLUTION INTRAVENOUS
Status: COMPLETED | OUTPATIENT
Start: 2018-11-01 | End: 2018-11-01

## 2018-11-01 RX ORDER — EPHEDRINE SULFATE 50 MG/ML
INJECTION, SOLUTION INTRAMUSCULAR; INTRAVENOUS; SUBCUTANEOUS PRN
Status: DISCONTINUED | OUTPATIENT
Start: 2018-11-01 | End: 2018-11-01

## 2018-11-01 RX ORDER — VANCOMYCIN HCL 900 MCG/MG
POWDER (GRAM) MISCELLANEOUS PRN
Status: DISCONTINUED | OUTPATIENT
Start: 2018-11-01 | End: 2018-11-01 | Stop reason: HOSPADM

## 2018-11-01 RX ORDER — ONDANSETRON 2 MG/ML
INJECTION INTRAMUSCULAR; INTRAVENOUS PRN
Status: DISCONTINUED | OUTPATIENT
Start: 2018-11-01 | End: 2018-11-01

## 2018-11-01 RX ORDER — MAGNESIUM HYDROXIDE 1200 MG/15ML
LIQUID ORAL PRN
Status: DISCONTINUED | OUTPATIENT
Start: 2018-11-01 | End: 2018-11-01 | Stop reason: HOSPADM

## 2018-11-01 RX ADMIN — PROPOFOL 150 MG: 10 INJECTION, EMULSION INTRAVENOUS at 14:00

## 2018-11-01 RX ADMIN — DEXAMETHASONE SODIUM PHOSPHATE 4 MG: 4 INJECTION, SOLUTION INTRA-ARTICULAR; INTRALESIONAL; INTRAMUSCULAR; INTRAVENOUS; SOFT TISSUE at 14:39

## 2018-11-01 RX ADMIN — PROPOFOL 30 MG: 10 INJECTION, EMULSION INTRAVENOUS at 16:24

## 2018-11-01 RX ADMIN — Medication 5 MG: at 15:48

## 2018-11-01 RX ADMIN — CEFAZOLIN SODIUM 1 G: 2 INJECTION, SOLUTION INTRAVENOUS at 16:25

## 2018-11-01 RX ADMIN — DEXMEDETOMIDINE HYDROCHLORIDE 4 MCG: 100 INJECTION, SOLUTION INTRAVENOUS at 16:50

## 2018-11-01 RX ADMIN — PHENYLEPHRINE HYDROCHLORIDE 100 MCG: 10 INJECTION, SOLUTION INTRAMUSCULAR; INTRAVENOUS; SUBCUTANEOUS at 16:20

## 2018-11-01 RX ADMIN — PROPOFOL 40 MG: 10 INJECTION, EMULSION INTRAVENOUS at 15:55

## 2018-11-01 RX ADMIN — SODIUM CHLORIDE 1 G: 9 INJECTION, SOLUTION INTRAVENOUS at 16:35

## 2018-11-01 RX ADMIN — PROPOFOL 30 MG: 10 INJECTION, EMULSION INTRAVENOUS at 15:37

## 2018-11-01 RX ADMIN — PHENYLEPHRINE HYDROCHLORIDE 100 MCG: 10 INJECTION, SOLUTION INTRAMUSCULAR; INTRAVENOUS; SUBCUTANEOUS at 16:07

## 2018-11-01 RX ADMIN — DIAZEPAM 2 MG: 2 TABLET ORAL at 09:38

## 2018-11-01 RX ADMIN — HYDROMORPHONE HYDROCHLORIDE 0.5 MG: 1 INJECTION, SOLUTION INTRAMUSCULAR; INTRAVENOUS; SUBCUTANEOUS at 15:02

## 2018-11-01 RX ADMIN — VENLAFAXINE HYDROCHLORIDE 37.5 MG: 37.5 CAPSULE, EXTENDED RELEASE ORAL at 09:00

## 2018-11-01 RX ADMIN — DEXMEDETOMIDINE HYDROCHLORIDE 8 MCG: 100 INJECTION, SOLUTION INTRAVENOUS at 16:52

## 2018-11-01 RX ADMIN — Medication 10 MG: at 14:14

## 2018-11-01 RX ADMIN — SODIUM CHLORIDE: 9 INJECTION, SOLUTION INTRAVENOUS at 20:27

## 2018-11-01 RX ADMIN — SENNOSIDES AND DOCUSATE SODIUM 1 TABLET: 8.6; 5 TABLET ORAL at 09:00

## 2018-11-01 RX ADMIN — DEXMEDETOMIDINE HYDROCHLORIDE 8 MCG: 100 INJECTION, SOLUTION INTRAVENOUS at 14:34

## 2018-11-01 RX ADMIN — OXYCODONE HYDROCHLORIDE 5 MG: 5 TABLET ORAL at 06:37

## 2018-11-01 RX ADMIN — Medication 5 MG: at 15:22

## 2018-11-01 RX ADMIN — Medication 5 MG: at 16:20

## 2018-11-01 RX ADMIN — LOVASTATIN 40 MG: 20 TABLET ORAL at 20:27

## 2018-11-01 RX ADMIN — Medication 5 MG: at 16:26

## 2018-11-01 RX ADMIN — PROPOFOL 20 MCG/KG/MIN: 10 INJECTION, EMULSION INTRAVENOUS at 14:04

## 2018-11-01 RX ADMIN — PHENYLEPHRINE HYDROCHLORIDE 100 MCG: 10 INJECTION, SOLUTION INTRAMUSCULAR; INTRAVENOUS; SUBCUTANEOUS at 15:23

## 2018-11-01 RX ADMIN — HYDROMORPHONE HYDROCHLORIDE 0.5 MG: 1 INJECTION, SOLUTION INTRAMUSCULAR; INTRAVENOUS; SUBCUTANEOUS at 20:28

## 2018-11-01 RX ADMIN — DEXMEDETOMIDINE HYDROCHLORIDE 8 MCG: 100 INJECTION, SOLUTION INTRAVENOUS at 14:36

## 2018-11-01 RX ADMIN — MIDAZOLAM 2 MG: 1 INJECTION INTRAMUSCULAR; INTRAVENOUS at 13:54

## 2018-11-01 RX ADMIN — CEFAZOLIN SODIUM 2 G: 2 INJECTION, SOLUTION INTRAVENOUS at 14:17

## 2018-11-01 RX ADMIN — Medication 5 MG: at 15:19

## 2018-11-01 RX ADMIN — ONDANSETRON 4 MG: 2 INJECTION INTRAMUSCULAR; INTRAVENOUS at 16:29

## 2018-11-01 RX ADMIN — SODIUM CHLORIDE, POTASSIUM CHLORIDE, SODIUM LACTATE AND CALCIUM CHLORIDE: 600; 310; 30; 20 INJECTION, SOLUTION INTRAVENOUS at 14:54

## 2018-11-01 RX ADMIN — PROPOFOL 50 MG: 10 INJECTION, EMULSION INTRAVENOUS at 15:03

## 2018-11-01 RX ADMIN — Medication 10 MG: at 14:07

## 2018-11-01 RX ADMIN — OXYCODONE HYDROCHLORIDE 5 MG: 5 TABLET ORAL at 02:43

## 2018-11-01 RX ADMIN — Medication 10 MG: at 14:41

## 2018-11-01 RX ADMIN — PHENYLEPHRINE HYDROCHLORIDE 100 MCG: 10 INJECTION, SOLUTION INTRAMUSCULAR; INTRAVENOUS; SUBCUTANEOUS at 14:35

## 2018-11-01 RX ADMIN — PROPOFOL 30 MG: 10 INJECTION, EMULSION INTRAVENOUS at 16:45

## 2018-11-01 RX ADMIN — HYDROMORPHONE HYDROCHLORIDE 0.5 MG: 1 INJECTION, SOLUTION INTRAMUSCULAR; INTRAVENOUS; SUBCUTANEOUS at 16:44

## 2018-11-01 RX ADMIN — LOVASTATIN 40 MG: 20 TABLET ORAL at 00:13

## 2018-11-01 RX ADMIN — LIDOCAINE HYDROCHLORIDE 100 MG: 20 INJECTION, SOLUTION INFILTRATION; PERINEURAL at 14:00

## 2018-11-01 RX ADMIN — SENNOSIDES AND DOCUSATE SODIUM 1 TABLET: 8.6; 5 TABLET ORAL at 21:48

## 2018-11-01 RX ADMIN — SODIUM CHLORIDE, POTASSIUM CHLORIDE, SODIUM LACTATE AND CALCIUM CHLORIDE: 600; 310; 30; 20 INJECTION, SOLUTION INTRAVENOUS at 13:00

## 2018-11-01 RX ADMIN — DEXMEDETOMIDINE HYDROCHLORIDE 4 MCG: 100 INJECTION, SOLUTION INTRAVENOUS at 14:38

## 2018-11-01 RX ADMIN — ALPRAZOLAM 0.5 MG: 0.5 TABLET ORAL at 00:13

## 2018-11-01 RX ADMIN — ACETAMINOPHEN 975 MG: 325 TABLET, FILM COATED ORAL at 21:48

## 2018-11-01 RX ADMIN — SODIUM CHLORIDE 1 G: 9 INJECTION, SOLUTION INTRAVENOUS at 14:22

## 2018-11-01 RX ADMIN — DEXMEDETOMIDINE HYDROCHLORIDE 8 MCG: 100 INJECTION, SOLUTION INTRAVENOUS at 16:53

## 2018-11-01 RX ADMIN — SODIUM CHLORIDE, POTASSIUM CHLORIDE, SODIUM LACTATE AND CALCIUM CHLORIDE: 600; 310; 30; 20 INJECTION, SOLUTION INTRAVENOUS at 15:52

## 2018-11-01 RX ADMIN — PHENYLEPHRINE HYDROCHLORIDE 100 MCG: 10 INJECTION, SOLUTION INTRAMUSCULAR; INTRAVENOUS; SUBCUTANEOUS at 15:14

## 2018-11-01 RX ADMIN — FENTANYL CITRATE 50 MCG: 50 INJECTION, SOLUTION INTRAMUSCULAR; INTRAVENOUS at 14:32

## 2018-11-01 RX ADMIN — FENTANYL CITRATE 50 MCG: 50 INJECTION, SOLUTION INTRAMUSCULAR; INTRAVENOUS at 14:01

## 2018-11-01 RX ADMIN — PHENYLEPHRINE HYDROCHLORIDE 100 MCG: 10 INJECTION, SOLUTION INTRAMUSCULAR; INTRAVENOUS; SUBCUTANEOUS at 16:25

## 2018-11-01 RX ADMIN — ASPIRIN 325 MG: 325 TABLET, DELAYED RELEASE ORAL at 20:27

## 2018-11-01 ASSESSMENT — ENCOUNTER SYMPTOMS
NUMBNESS: 0
WEAKNESS: 1
WOUND: 0

## 2018-11-01 ASSESSMENT — ACTIVITIES OF DAILY LIVING (ADL)
ADLS_ACUITY_SCORE: 11
ADLS_ACUITY_SCORE: 10

## 2018-11-01 ASSESSMENT — LIFESTYLE VARIABLES: TOBACCO_USE: 0

## 2018-11-01 NOTE — ANESTHESIA CARE TRANSFER NOTE
Patient: Josie Childs    Procedure(s):  ARTHROPLASTY HIP ANTERIOR    Diagnosis: FEMORAL NECK FRACTURE.  Diagnosis Additional Information: No value filed.    Anesthesia Type:   General, LMA     Note:  Airway :Face Mask  Patient transferred to:PACU  Comments: Pt to PACU. VSS. Report complete to RN.Handoff Report: Identifed the Patient, Identified the Reponsible Provider, Reviewed the pertinent medical history, Discussed the surgical course, Reviewed Intra-OP anesthesia mangement and issues during anesthesia, Set expectations for post-procedure period and Allowed opportunity for questions and acknowledgement of understanding      Vitals: (Last set prior to Anesthesia Care Transfer)    CRNA VITALS  11/1/2018 1648 - 11/1/2018 1724      11/1/2018             Pulse: 87    SpO2: 100 %    Resp Rate (set): 10                Electronically Signed By: Mami Musa CRNA, JAMA ANTONIO  November 1, 2018  5:24 PM

## 2018-11-01 NOTE — BRIEF OP NOTE
[facility] Brief Operative Note    Pre-operative diagnosis: FEMORAL NECK FRACTURE.   Post-operative diagnosis Right femoral neck fracture   Procedure: Procedure(s):  ARTHROPLASTY HIP ANTERIOR   Surgeon(s): Surgeon(s) and Role:     * Bar Chavez MD - Primary     * Christen Clark PA-C - Assisting   Estimated blood loss: 200 mL    Specimens: * No specimens in log *   Findings: Please see dictated operative note

## 2018-11-01 NOTE — PLAN OF CARE
Problem: Patient Care Overview  Goal: Plan of Care/Patient Progress Review  Outcome: No Change  Pt A/O X4. CMS intact. VSS on RA. Bedrest. [place]aga patent. PO oxy for pain. Valium for muscle spasms. Pt going for surgery of R hip today @1. Continue to monitor.

## 2018-11-01 NOTE — ANESTHESIA PREPROCEDURE EVALUATION
Procedure: Procedure(s):  ARTHROPLASTY HIP ANTERIOR  Preop diagnosis: FEMORAL NECK FRACTURE.    Allergies   Allergen Reactions     Carafate [Sucralfate] Hives     Nexium [Esomeprazole Magnesium Trihydrate] Hives     Tegretol [Carbamazepine]        Past Medical History:   Diagnosis Date     ESOPHAGEAL REFLUX        No past surgical history on file.    Social History   Substance Use Topics     Smoking status: Never Smoker     Smokeless tobacco: Not on file     Alcohol use No       Prior to Admission medications    Medication Sig Start Date End Date Taking? Authorizing Provider   ALPRAZolam (XANAX) 0.5 MG tablet Take 0.5 mg by mouth At Bedtime    Unknown, Entered By History   hydrocortisone (PROCTOSOL HC) 2.5 % cream Place rectally as needed for hemorrhoids    Unknown, Entered By History   lovastatin (MEVACOR) 40 MG tablet Take 40 mg by mouth every evening  4/15/11      meclizine (ANTIVERT) 25 MG tablet Take 1 tablet (25 mg) by mouth every 6 hours as needed for dizziness 9/15/17   James Talavera MD   Multiple Vitamins-Minerals (MULTIVITAMIN ADULT PO) Take 1 tablet by mouth daily    Unknown, Entered By History   polyethylene glycol (MIRALAX/GLYCOLAX) Packet Take 1 packet by mouth daily as needed for constipation    Unknown, Entered By History   venlafaxine (EFFEXOR-XR) 37.5 MG 24 hr capsule Take 37.5 mg by mouth every morning    Unknown, Entered By History     Current Facility-Administered Medications Ordered in Epic   Medication Dose Route Frequency Last Rate Last Dose     acetaminophen (TYLENOL) Suppository 650 mg  650 mg Rectal Q4H PRN         acetaminophen (TYLENOL) tablet 650 mg  650 mg Oral Q4H PRN         ALPRAZolam (XANAX) tablet 0.5 mg  0.5 mg Oral At Bedtime PRN         bisacodyl (DULCOLAX) Suppository 10 mg  10 mg Rectal Daily PRN         ceFAZolin (ANCEF) 1 g vial to attach to  ml bag for ADULT or 50 ml bag for PEDS  1 g Intravenous See Admin Instructions         ceFAZolin (ANCEF) intermittent  infusion 2 g in 100 mL dextrose PRE-MIX  2 g Intravenous Pre-Op/Pre-procedure x 1 dose         diazepam (VALIUM) tablet 2 mg  2 mg Oral Q6H PRN   2 mg at 11/01/18 0938     hydrocortisone (ANUSOL-HC) 2.5 % cream   Rectal Daily PRN         ketorolac (TORADOL) injection 30 mg  30 mg Intravenous Q6H PRN         lactated ringers infusion   Intravenous Continuous         lidocaine 1 % 1 mL  1 mL Other Q1H PRN         lovastatin (MEVACOR) tablet 40 mg  40 mg Oral QPM   40 mg at 11/01/18 0013     meclizine (ANTIVERT) tablet 25 mg  25 mg Oral Q6H PRN         melatonin tablet 1 mg  1 mg Oral At Bedtime PRN         naloxone (NARCAN) injection 0.1-0.4 mg  0.1-0.4 mg Intravenous Q2 Min PRN         ondansetron (ZOFRAN-ODT) ODT tab 4 mg  4 mg Oral Q6H PRN        Or     ondansetron (ZOFRAN) injection 4 mg  4 mg Intravenous Q6H PRN         oxyCODONE IR (ROXICODONE) tablet 5 mg  5 mg Oral Q4H PRN   5 mg at 11/01/18 0637     polyethylene glycol (MIRALAX/GLYCOLAX) Packet 17 g  17 g Oral Daily PRN         potassium chloride (KLOR-CON) Packet 20-40 mEq  20-40 mEq Oral or Feeding Tube Q2H PRN         potassium chloride 10 mEq in 100 mL intermittent infusion with 10 mg lidocaine  10 mEq Intravenous Q1H PRN         potassium chloride 10 mEq in 100 mL sterile water intermittent infusion (premix)  10 mEq Intravenous Q1H PRN         potassium chloride 20 mEq in 50 mL intermittent infusion  20 mEq Intravenous Q1H PRN         potassium chloride SA (K-DUR/KLOR-CON M) CR tablet 20-40 mEq  20-40 mEq Oral Q2H PRN         senna-docusate (SENOKOT-S;PERICOLACE) 8.6-50 MG per tablet 1 tablet  1 tablet Oral BID   1 tablet at 11/01/18 0900    Or     senna-docusate (SENOKOT-S;PERICOLACE) 8.6-50 MG per tablet 2 tablet  2 tablet Oral BID         sodium chloride 0.9% infusion   Intravenous Continuous 75 mL/hr at 10/31/18 2230       tranexamic acid (CYKLOKAPRON) 1 g in sodium chloride 0.9 % 60 mL bolus  1 g Intravenous Once         tranexamic acid  (CYKLOKAPRON) 1 g in sodium chloride 0.9 % 60 mL infusion  1 g Intravenous Continuous         venlafaxine (EFFEXOR-XR) 24 hr capsule 37.5 mg  37.5 mg Oral QAM   37.5 mg at 11/01/18 0900     No current [facility]-ordered outpatient prescriptions on file.       lactated ringers       sodium chloride 75 mL/hr at 10/31/18 2230     tranexamic acid         Wt Readings from Last 1 Encounters:   10/31/18 70.3 kg (155 lb)     Temp Readings from Last 1 Encounters:   11/01/18 37.3  C (99.1  F) (Oral)     BP Readings from Last 6 Encounters:   11/01/18 120/70   10/31/18 103/70   09/15/17 (!) 144/93   12/28/10 122/74   07/20/10 128/72   05/26/10 128/78     Pulse Readings from Last 4 Encounters:   11/01/18 79   10/31/18 85   09/15/17 56   12/28/10 90     Resp Readings from Last 1 Encounters:   11/01/18 16     SpO2 Readings from Last 1 Encounters:   11/01/18 92%     Recent Labs   Lab Test  11/01/18   0637  10/31/18   1559   NA  139  142   POTASSIUM  4.0  3.8   CHLORIDE  109  108   CO2  24  26   ANIONGAP  6  8   GLC  101*  93   BUN  8  16   CR  0.67  0.76   AVILA  7.9*  8.5     Recent Labs   Lab Test  03/26/11   0818  12/31/10   0920   AST   --   24   ALT  16  32   ALKPHOS   --   36*   BILITOTAL   --   0.5     Recent Labs   Lab Test  10/31/18   2255  10/31/18   1559  09/15/17   0855   WBC   --   11.8*  7.9   HGB  11.2*  12.7  13.9   PLT   --   255  229     Recent Labs   Lab Test  11/01/18   0637   ABO  A   RH  Pos     No results for input(s): INR, PTT in the last 70707 hours.   No results for input(s): TROPI in the last 28699 hours.  No results for input(s): PH, PCO2, PO2, HCO3 in the last 87496 hours.  Recent Labs   Lab Test  04/15/11   0715   HCG  Negative       Recent Results (from the past 744 hour(s))   XR Femur Right 2 Views    Narrative    XR PELVIS 1/2 VW,   XR FEMUR RT 2 VW 10/31/2018 3:28 PM    HISTORY: Hip pain.    COMPARISON: None.    FINDINGS: Right femoral neck fracture. Left proximal femur and pelvis  appear intact. Distal  right femur appears normal.      Impression    IMPRESSION: Right femoral neck fracture.    ELANA CARPENTER MD   XR Pelvis 1/2 Views    Narrative    XR PELVIS 1/2 VW,   XR FEMUR RT 2 VW 10/31/2018 3:28 PM    HISTORY: Hip pain.    COMPARISON: None.    FINDINGS: Right femoral neck fracture. Left proximal femur and pelvis  appear intact. Distal right femur appears normal.      Impression    IMPRESSION: Right femoral neck fracture.    ELANA CARPENTER MD       Anesthesia Evaluation     .             ROS/MED HX    ENT/Pulmonary:      (-) tobacco use   Neurologic:       Cardiovascular:     (+) Dyslipidemia, ----. : . . . :. . Previous cardiac testing date:results:date: results:ECG reviewed date:10/31/18 results:NSR date: results:          METS/Exercise Tolerance:     Hematologic:         Musculoskeletal:   (+) , fracture lower extremity: Hip, -       GI/Hepatic:     (+) GERD Asymptomatic on medication, Other GI/Hepatic IBS      Renal/Genitourinary:         Endo:         Psychiatric:     (+) psychiatric history other (comment), anxiety and depression (Fibromyalgia)      Infectious Disease:         Malignancy:         Other: Comment: Fibromyalgia    (+) H/O Chronic Pain,                   Physical Exam  Normal systems: dental    Airway   Mallampati: III  TM distance: >3 FB  Neck ROM: full    Dental     Cardiovascular   Rhythm and rate: regular      Pulmonary    breath sounds clear to auscultation                    Anesthesia Plan      History & Physical Review  History and physical reviewed and following examination; no interval change.    ASA Status:  2 .    NPO Status:  > 8 hours    Plan for General and LMA with Intravenous and Propofol induction. Maintenance will be Balanced.    PONV prophylaxis:  Ondansetron (or other 5HT-3) and Dexamethasone or Solumedrol       Postoperative Care  Postoperative pain management:  IV analgesics, Oral pain medications and Multi-modal analgesia.      Consents  Anesthetic plan, risks, benefits  and alternatives discussed with:  Patient..                          .

## 2018-11-01 NOTE — CONSULTS
[facility] Orthopedic Consultation    Josie Childs MRN# [id number]   Age: 58 year old YOB: 1960     Date of Admission:  10/31/2018    Reason for consult: Right femoral neck fracture       Requesting physician: Dr. jared Pollard       Level of consult: Consult, follow and place orders           Assessment and Plan:   Assessment:   Right femoral neck fracture      Plan:   OR for DA right total hip  NPO  Bed rest  Pain medication as needed  Muscle relaxant for spasms  Optimized medically by hospitalist           Chief Complaint:   Right femoral neck fracture         History of Present Illness:   This patient is a 58 year old female who presents with the following condition requiring a hospital admission:    Mrs. Childs states that she was holding her young grandson when she went to step backwards she tripped over the dog which had unknowingly laid at her feet. As a result, she fell onto her right hip/side. She was unable to stand or ambulate post fall. She was initially taken to [facility] ED for further workup and treatment, but unfortunately they did not have time in the OR block for us to do surgery in a timely fashion. She was then transferred to [facility] for definitive management.   She is not on anticoagulation.          Past Medical History:     Past Medical History:   Diagnosis Date     ESOPHAGEAL REFLUX              Past Surgical History:   No past surgical history on file.          Social History:     Social History   Substance Use Topics     Smoking status: Never Smoker     Smokeless tobacco: Not on file     Alcohol use No             Family History:     Family History   Problem Relation Age of Onset     Neurologic Disorder Mother      Parkinson's     Diabetes Father      Alcohol/Drug Father              Immunizations:     VACCINE/DOSE   Diptheria   DPT   DTAP   HBIG   Hepatitis A   Hepatitis B   HIB   Influenza   Measles   Meningococcal   MMR   Mumps   Pneumococcal   Polio   Rubella    Small Pox   TDAP   Varicella   Zoster             Allergies:     Allergies   Allergen Reactions     Carafate [Sucralfate] Hives     Nexium [Esomeprazole Magnesium Trihydrate] Hives     Tegretol [Carbamazepine]              Medications:     Current Facility-Administered Medications   Medication     acetaminophen (TYLENOL) Suppository 650 mg     acetaminophen (TYLENOL) tablet 650 mg     ALPRAZolam (XANAX) tablet 0.5 mg     bisacodyl (DULCOLAX) Suppository 10 mg     ceFAZolin (ANCEF) 1 g vial to attach to  ml bag for ADULT or 50 ml bag for PEDS     ceFAZolin (ANCEF) intermittent infusion 2 g in 100 mL dextrose PRE-MIX     diazepam (VALIUM) tablet 2 mg     hydrocortisone (ANUSOL-HC) 2.5 % cream     ketorolac (TORADOL) injection 30 mg     lactated ringers infusion     lidocaine 1 % 1 mL     lovastatin (MEVACOR) tablet 40 mg     meclizine (ANTIVERT) tablet 25 mg     melatonin tablet 1 mg     methocarbamol (ROBAXIN) tablet 500 mg     naloxone (NARCAN) injection 0.1-0.4 mg     ondansetron (ZOFRAN-ODT) ODT tab 4 mg    Or     ondansetron (ZOFRAN) injection 4 mg     oxyCODONE IR (ROXICODONE) tablet 5 mg     polyethylene glycol (MIRALAX/GLYCOLAX) Packet 17 g     potassium chloride (KLOR-CON) Packet 20-40 mEq     potassium chloride 10 mEq in 100 mL intermittent infusion with 10 mg lidocaine     potassium chloride 10 mEq in 100 mL sterile water intermittent infusion (premix)     potassium chloride 20 mEq in 50 mL intermittent infusion     potassium chloride SA (K-DUR/KLOR-CON M) CR tablet 20-40 mEq     senna-docusate (SENOKOT-S;PERICOLACE) 8.6-50 MG per tablet 1 tablet    Or     senna-docusate (SENOKOT-S;PERICOLACE) 8.6-50 MG per tablet 2 tablet     sodium chloride 0.9% infusion     tranexamic acid (CYKLOKAPRON) 1 g in sodium chloride 0.9 % 60 mL bolus     tranexamic acid (CYKLOKAPRON) 1 g in sodium chloride 0.9 % 60 mL infusion     venlafaxine (EFFEXOR-XR) 24 hr capsule 37.5 mg             Review of Systems:   CV:  NEGATIVE for chest pain, palpitations or peripheral edema  C: NEGATIVE for fever, chills, change in weight  E/M: NEGATIVE for ear, mouth and throat problems  R: NEGATIVE for significant cough or SOB    10 point review of systems negative aside from HPI and physical exam.           Physical Exam:   All vitals have been reviewed  Patient Vitals for the past 24 hrs:   BP Temp Temp src Pulse Resp SpO2   11/01/18 0725 120/70 99.1  F (37.3  C) Oral 79 16 92 %   11/01/18 0722 - - - - 18 -   11/01/18 0637 - - - - 18 -   11/01/18 0020 119/69 98.5  F (36.9  C) Oral - 16 96 %   10/31/18 2224 - - - - 16 -   10/31/18 2100 137/81 98.9  F (37.2  C) Oral 81 18 96 %       Intake/Output Summary (Last 24 hours) at 11/01/18 1123  Last data filed at 11/01/18 0641   Gross per 24 hour   Intake                0 ml   Output              750 ml   Net             -750 ml     Constitutional: Pleasant, alert, appropriate, following commands.  HEENT: Head atraumatic normocephalic. PERRLA.  Respiratory: Unlabored breathing no audible wheeze  Cardiovascular: Regular rate and rhythm  GI: Abdomen soft, non tender, and non distended.  Lymph/Hematologic: No edema or palor  Skin: No rashes, no cyanosis, no edema.  Neuro: Sensation intact to light touch in bilateral lower extremities.  Musculoskeletal:   Right leg appears mildly shortened and externally rotated  Bilateral calves are soft, non-tender.  Bilateral lower extremity is NVI.  Sensation intact bilateral lower extremities  Active dorsi and plantar flexion bilaterally  +Dp pulse            Data:   All laboratory data reviewed  Results for orders placed or performed during the hospital encounter of 10/31/18   Hemoglobin and hematocrit   Result Value Ref Range    Hemoglobin 11.2 (L) 11.7 - 15.7 g/dL    Hematocrit 33.6 (L) 35.0 - 47.0 %   Basic metabolic panel   Result Value Ref Range    Sodium 139 133 - 144 mmol/L    Potassium 4.0 3.4 - 5.3 mmol/L    Chloride 109 94 - 109 mmol/L    Carbon Dioxide  24 20 - 32 mmol/L    Anion Gap 6 3 - 14 mmol/L    Glucose 101 (H) 70 - 99 mg/dL    Urea Nitrogen 8 7 - 30 mg/dL    Creatinine 0.67 0.52 - 1.04 mg/dL    GFR Estimate >90 >60 mL/min/1.7m2    GFR Estimate If Black >90 >60 mL/min/1.7m2    Calcium 7.9 (L) 8.5 - 10.1 mg/dL   ABO/Rh type and screen   Result Value Ref Range    ABO A     RH(D) Pos     Antibody Screen Neg     Test Valid Only At [facility]        Specimen Expires 11/04/2018           Attestation:  I have reviewed today's vital signs, notes, medications, labs and imaging with Dr. Chavez.  Amount of time performed on this consult: 20 minutes.    Christen Clark PA-C

## 2018-11-01 NOTE — PLAN OF CARE
Problem: Patient Care Overview  Goal: Plan of Care/Patient Progress Review  Outcome: No Change  A/Ox4. VSS on RA. CMS intact. Pain managed with oxycodone and ice packs. Bedrest. Arteaga. IVF @ 75 ml/h. Plan for surgery tomorrow afternoon. NPO at midnight. [place] screening tool done. Will continue to monitor.

## 2018-11-01 NOTE — H&P
Admitted:     10/31/2018      PRIMARY CARE PHYSICIAN:  Errol Pineda.      CHIEF COMPLAINT:  Right hip pain.      HISTORY OF PRESENT ILLNESS:  Josie Chlids is a 58-year-old very pleasant woman with medical history significant for fibromyalgia, depression, anxiety, GERD, IBS, presented to the [facility] ER for evaluation of right hip pain after a fall.  I discussed with the patient and reviewed her record in Epic for further information.      The patient was holding her [id number] grandson.  She tripped on the dog and landed on her right hip.  She started having right hip pain immediately.  She felt she had significant impact and immediately though something was wrong with the hip, possibly a fracture.  She could not get up.  Denies hitting head or loss of consciousness.  The grandson was not injured.  They called 911, and she was taken to [facility] ER.        In the ER, patient was evaluated with labs and hip x-ray which showed right femoral neck fracture.  Orthopedic surgeon was consulted, but because patient did not get any OR time tomorrow, the patient was transferred to [facility].      The patient reports about 5/10 pain currently and feels like her thigh muscles are going into spasm.  Otherwise, denies any symptoms including nausea, vomiting, chest pain, shortness of breath, dizziness.  The patient has good activity tolerance and denies any exertional activity shortness of breath, chest pain in the recent past.      REVIEW OF SYSTEMS:  All 10-point systems were reviewed and negative other than mentioned in the HPI.      PAST MEDICAL HISTORY:   1.  GERD.   2.  Fibromyalgia.   3.  Irritable bowel syndrome.   4.  Anxiety and depression.   5.  Vertigo.      PAST SURGICAL HISTORY:  Tonsillectomy, appendectomy, cholecystectomy, and colonoscopy.      FAMILY HISTORY:  Parkinson's disease, diabetes, alcohol abuse, osteoporosis, and breast cancer.      SOCIAL  HISTORY:  Does not smoke, does not drink alcohol, no recreational drug use.  Lives with her , walks 3-5 miles every day.  She is in the hospital room accompanied by her .      ALLERGIES:  CARAFATE, NEXIUM, TEGRETOL.      HOME MEDICATIONS:   1.  Alprazolam 0.5 mg by mouth at bedtime.   2.  Proctosol 2.5% cream rectally as needed for hemorrhoids.   3.  Mevacor 40 mg by mouth every evening.   4.  Meclizine 25 mg every 6 hours as needed.   5.  Multivitamin 1 tab by mouth daily.   6.  MiraLax 17 grams p.o. daily as needed.   7.  Effexor 37.5 mg XR capsule p.o. daily.      PHYSICAL EXAMINATION:   GENERAL:  The patient is alert, awake, oriented, pleasant, does not appear to be in distress.   VITAL SIGNS:  Blood pressure 137/81, pulse 81, temperature 98.9, respirations 18, pulse ox 96 on room air.   HEENT:  PERRLA, EOMI.  Oral mucosa moist.   NECK:  Supple.   LUNGS:  Bilateral equal air entry, clear to auscultation.  Normal work of breathing.   CARDIOVASCULAR:  Sinus regular, no murmur, rub, gallop, no tachycardia.  ABDOMEN:  Soft, nontender, bowel sounds active.   MUSCULOSKELETAL:  Tenderness around her right hip with limited range of motion.   EXTREMITIES:  No clubbing, cyanosis, no bruises or open wound around the right hip.  Distal pulses intact.   NEUROLOGIC:  Alert, oriented x 3.  No focal deficit except right leg movement not tested.      LABORATORY DATA:  BMP, CBC reviewed.  WBC is 11.8, otherwise labs unremarkable.  Blood sugar 93.  Pelvic x-ray showed right femoral neck fracture.  A 12-lead EKG is sinus, normal rate, with no ischemic findings.      ASSESSMENT AND PLAN:  Josie Childs is a 58-year-old pleasant woman with fibromyalgia, hyperlipidemia, GERD, irritable bowel syndrome, anxiety and depression, vertigo, who presented to the ER due to a mechanical fall and subsequent right hip pain. She was noted to have right femoral neck fracture and was transferred to [facility] for  further management.   1.  Mechanical fall resulting in right femoral neck fracture.  The patient will be admitted to inpatient.  Orthopedic Surgery consulted, possible surgery tomorrow.  I will keep her n.p.o. after midnight with IV hydration, pain control, add muscle relaxant, low dose Valium.  The patient has good activity tolerance without any cardiac symptoms.  No further workup required.  Okay to proceed with surgery.  Postop pain control, DVT prophylaxis.  Diet, IV fluid per Orthopedic Surgery.   2.  Stable medical problems, including hyperlipidemia, anxiety, depression, dizziness.  PTA medications for these conditions will be resumed.   3.  Deep venous thrombosis prophylaxis:  PCDs, per Ortho postoperatively.      CODE STATUS:  Full code by default.      Anticipate 2 days after surgery.  Care plan discussed with the patient and her  at bedside.         GONZALO CARDONA MD             D: 10/31/2018   T: 2018   MT: KRISTEN      Name:     BISHNU NEWTON   MRN:      [id number]        Account:      [id number]   :      1960        Admitted:     10/31/2018                   Document: [id number]

## 2018-11-01 NOTE — PLAN OF CARE
Problem: Patient Care Overview  Goal: Plan of Care/Patient Progress Review  Outcome: No Change  Pt. Alert and oriented x4. VSS. CMS intact. Pain managed with prn Oxycodone. Arteaga in place.  NPO maintained. Bedrest. Plan surgery today at 1300. Continue to monitor.

## 2018-11-02 ENCOUNTER — APPOINTMENT (OUTPATIENT)
Dept: PHYSICAL THERAPY | Facility: CLINIC | Age: 58
DRG: 470 | End: 2018-11-02
Attending: PHYSICIAN ASSISTANT
Payer: COMMERCIAL

## 2018-11-02 LAB
GLUCOSE SERPL-MCNC: 105 MG/DL (ref 70–99)
HGB BLD-MCNC: 10.2 G/DL (ref 11.7–15.7)

## 2018-11-02 PROCEDURE — 40000193 ZZH STATISTIC PT WARD VISIT

## 2018-11-02 PROCEDURE — 99231 SBSQ HOSP IP/OBS SF/LOW 25: CPT | Performed by: INTERNAL MEDICINE

## 2018-11-02 PROCEDURE — 25000132 ZZH RX MED GY IP 250 OP 250 PS 637: Performed by: HOSPITALIST

## 2018-11-02 PROCEDURE — 97161 PT EVAL LOW COMPLEX 20 MIN: CPT | Mod: GP | Performed by: PHYSICAL THERAPIST

## 2018-11-02 PROCEDURE — 25000132 ZZH RX MED GY IP 250 OP 250 PS 637: Performed by: INTERNAL MEDICINE

## 2018-11-02 PROCEDURE — 12000007 ZZH R&B INTERMEDIATE

## 2018-11-02 PROCEDURE — 25000132 ZZH RX MED GY IP 250 OP 250 PS 637: Performed by: PHYSICIAN ASSISTANT

## 2018-11-02 PROCEDURE — 97530 THERAPEUTIC ACTIVITIES: CPT | Mod: GP

## 2018-11-02 PROCEDURE — 82947 ASSAY GLUCOSE BLOOD QUANT: CPT | Performed by: HOSPITALIST

## 2018-11-02 PROCEDURE — 97110 THERAPEUTIC EXERCISES: CPT | Mod: GP | Performed by: PHYSICAL THERAPIST

## 2018-11-02 PROCEDURE — 25000128 H RX IP 250 OP 636: Performed by: PHYSICIAN ASSISTANT

## 2018-11-02 PROCEDURE — 97116 GAIT TRAINING THERAPY: CPT | Mod: GP | Performed by: PHYSICAL THERAPIST

## 2018-11-02 PROCEDURE — 40000193 ZZH STATISTIC PT WARD VISIT: Performed by: PHYSICAL THERAPIST

## 2018-11-02 PROCEDURE — 85018 HEMOGLOBIN: CPT | Performed by: PHYSICIAN ASSISTANT

## 2018-11-02 PROCEDURE — 36415 COLL VENOUS BLD VENIPUNCTURE: CPT | Performed by: PHYSICIAN ASSISTANT

## 2018-11-02 PROCEDURE — 97116 GAIT TRAINING THERAPY: CPT | Mod: GP

## 2018-11-02 RX ORDER — OXYCODONE HYDROCHLORIDE 5 MG/1
5 TABLET ORAL EVERY 4 HOURS PRN
Qty: 30 TABLET | Refills: 0 | Status: SHIPPED | OUTPATIENT
Start: 2018-11-02

## 2018-11-02 RX ORDER — HYDROXYZINE HYDROCHLORIDE 25 MG/1
25 TABLET, FILM COATED ORAL EVERY 6 HOURS PRN
Qty: 30 TABLET | Refills: 0 | Status: SHIPPED | OUTPATIENT
Start: 2018-11-02

## 2018-11-02 RX ORDER — ASPIRIN 325 MG
325 TABLET, DELAYED RELEASE (ENTERIC COATED) ORAL DAILY
Qty: 30 TABLET | Refills: 0 | Status: SHIPPED | OUTPATIENT
Start: 2018-11-03

## 2018-11-02 RX ORDER — AMOXICILLIN 250 MG
2 CAPSULE ORAL 2 TIMES DAILY
Qty: 30 TABLET | Refills: 0 | Status: SHIPPED | OUTPATIENT
Start: 2018-11-02

## 2018-11-02 RX ORDER — ONDANSETRON 4 MG/1
4 TABLET, ORALLY DISINTEGRATING ORAL EVERY 6 HOURS PRN
Qty: 10 TABLET | Refills: 0 | Status: SHIPPED | OUTPATIENT
Start: 2018-11-02

## 2018-11-02 RX ORDER — ACETAMINOPHEN 325 MG/1
650 TABLET ORAL EVERY 6 HOURS PRN
Qty: 40 TABLET | Refills: 0 | Status: SHIPPED | OUTPATIENT
Start: 2018-11-02

## 2018-11-02 RX ADMIN — CEFAZOLIN 1 G: 1 INJECTION, POWDER, FOR SOLUTION INTRAMUSCULAR; INTRAVENOUS at 08:58

## 2018-11-02 RX ADMIN — OXYCODONE HYDROCHLORIDE 10 MG: 5 TABLET ORAL at 16:43

## 2018-11-02 RX ADMIN — SENNOSIDES AND DOCUSATE SODIUM 2 TABLET: 8.6; 5 TABLET ORAL at 20:27

## 2018-11-02 RX ADMIN — OXYCODONE HYDROCHLORIDE 10 MG: 5 TABLET ORAL at 22:31

## 2018-11-02 RX ADMIN — HYDROMORPHONE HYDROCHLORIDE 0.5 MG: 1 INJECTION, SOLUTION INTRAMUSCULAR; INTRAVENOUS; SUBCUTANEOUS at 12:14

## 2018-11-02 RX ADMIN — CEFAZOLIN 1 G: 1 INJECTION, POWDER, FOR SOLUTION INTRAMUSCULAR; INTRAVENOUS at 00:32

## 2018-11-02 RX ADMIN — HYDROXYZINE HYDROCHLORIDE 25 MG: 25 TABLET ORAL at 04:57

## 2018-11-02 RX ADMIN — OXYCODONE HYDROCHLORIDE 10 MG: 5 TABLET ORAL at 08:58

## 2018-11-02 RX ADMIN — SENNOSIDES AND DOCUSATE SODIUM 2 TABLET: 8.6; 5 TABLET ORAL at 08:58

## 2018-11-02 RX ADMIN — OXYCODONE HYDROCHLORIDE 5 MG: 5 TABLET ORAL at 00:31

## 2018-11-02 RX ADMIN — ACETAMINOPHEN 975 MG: 325 TABLET, FILM COATED ORAL at 12:54

## 2018-11-02 RX ADMIN — ALPRAZOLAM 0.5 MG: 0.5 TABLET ORAL at 20:27

## 2018-11-02 RX ADMIN — HYDROXYZINE HYDROCHLORIDE 25 MG: 25 TABLET ORAL at 11:29

## 2018-11-02 RX ADMIN — VENLAFAXINE HYDROCHLORIDE 37.5 MG: 37.5 CAPSULE, EXTENDED RELEASE ORAL at 08:58

## 2018-11-02 RX ADMIN — LOVASTATIN 40 MG: 20 TABLET ORAL at 20:27

## 2018-11-02 RX ADMIN — ACETAMINOPHEN 975 MG: 325 TABLET, FILM COATED ORAL at 04:54

## 2018-11-02 RX ADMIN — ASPIRIN 325 MG: 325 TABLET, DELAYED RELEASE ORAL at 08:58

## 2018-11-02 RX ADMIN — OXYCODONE HYDROCHLORIDE 5 MG: 5 TABLET ORAL at 04:57

## 2018-11-02 RX ADMIN — ACETAMINOPHEN 975 MG: 325 TABLET, FILM COATED ORAL at 20:27

## 2018-11-02 RX ADMIN — OXYCODONE HYDROCHLORIDE 10 MG: 5 TABLET ORAL at 12:54

## 2018-11-02 ASSESSMENT — ACTIVITIES OF DAILY LIVING (ADL)
ADLS_ACUITY_SCORE: 11
ADLS_ACUITY_SCORE: 10

## 2018-11-02 NOTE — PROGRESS NOTES
11/02/18 0800   Quick Adds   Type of Visit Initial PT Evaluation   Living Environment   Lives With spouse   Living Arrangements house   Home Accessibility stairs to enter home   Number of Stairs to Enter Home 2   Number of Stairs Within Home 14  (R railing)   Transportation Available car;family or friend will provide   Living Environment Comment States her spouse and children can assist with household tasks/laundry/errands as needed   Self-Care   Dominant Hand right   Usual Activity Tolerance excellent   Current Activity Tolerance moderate   Regular Exercise yes   Activity/Exercise Type ([place])   Exercise Amount/Frequency 2 times/wk   Equipment Currently Used at Home none   Activity/Exercise/Self-Care Comment Pt works as a    Functional Level Prior   Ambulation 0-->independent   Transferring 0-->independent   Toileting 0-->independent   Bathing 0-->independent   Dressing 0-->independent   Eating 0-->independent   Communication 0-->understands/communicates without difficulty   Swallowing 0-->swallows foods/liquids without difficulty   Cognition 0 - no cognition issues reported   Fall history within last six months yes   Number of times patient has fallen within last six months 2   Which of the above functional risks had a recent onset or change? ambulation;transferring   General Information   Onset of Illness/Injury or Date of Surgery - Date 11/01/18   Referring Physician Christen Clark PA-C   Patient/Family Goals Statement To return home with family   Pertinent History of Current Problem (include personal factors and/or comorbidities that impact the POC) Pt is a 59yo female seen POD#1 s/p R direct anterior total hip arthroplasty secondary to femoral neck fracture after fall.    Precautions/Limitations fall precautions   Weight-Bearing Status - RLE weight-bearing as tolerated   General Observations Pt resting in bed, pt with PCDs   General Info Comments activity: ambulate with assistance 3 times  "daily, out of bed day of surgery if pt tolerates   Cognitive Status Examination   Orientation orientation to person, place and time   Level of Consciousness alert   Follows Commands and Answers Questions 100% of the time;able to follow multistep instructions   Personal Safety and Judgment intact   Pain Assessment   Patient Currently in Pain Yes, see Vital Sign flowsheet   Integumentary/Edema   Integumentary/Edema Comments anterior hip incision with dressing appearing intact   Posture    Posture Not impaired   Range of Motion (ROM)   ROM Comment RLE limited by pain and \"stiffness\", LLE WNL with AROM   Strength   Strength Comments RLE limited by pain and post-op weakness, at least 3/5 with mobility tasks; LLE 5/5   Bed Mobility   Bed Mobility Comments min-modA supine>sit   Transfer Skills   Transfer Comments CGA sit>stand to FWW   Gait   Gait Comments CGA 5' with FWW, 3pt gait pattern, WBAT on RLE, antalgic   Balance   Balance Comments BUE for static stance   Sensory Examination   Sensory Perception Comments Denies N/T   General Therapy Interventions   Planned Therapy Interventions balance training;bed mobility training;gait training;ROM;strengthening;stretching;transfer training;home program guidelines;progressive activity/exercise   Clinical Impression   Criteria for Skilled Therapeutic Intervention yes, treatment indicated   PT Diagnosis Impaired gait   Influenced by the following impairments Decreased ROM, weakness, decreased balance, pain   Functional limitations due to impairments Decreased safety and IND with functional transfers, gait, stairs   Clinical Presentation Stable/Uncomplicated   Clinical Presentation Rationale clinically improving   Clinical Decision Making (Complexity) Low complexity   Therapy Frequency` 2 times/day   Predicted Duration of Therapy Intervention (days/wks) 3 days   Anticipated Discharge Disposition Home with Assist   Risk & Benefits of therapy have been explained Yes   Patient, Family " "& other staff in agreement with plan of care Yes   [facility] AM-PAC TM \"6 Clicks\"   2016, Trustees of [facility], under license to [employer].  All rights reserved.   6 Clicks Short Forms Basic Mobility Inpatient Short Form   [facility] AM-PAC  \"6 Clicks\" V.2 Basic Mobility Inpatient Short Form   1. Turning from your back to your side while in a flat bed without using bedrails? 4 - None   2. Moving from lying on your back to sitting on the side of a flat bed without using bedrails? 3 - A Little   3. Moving to and from a bed to a chair (including a wheelchair)? 3 - A Little   4. Standing up from a chair using your arms (e.g., wheelchair, or bedside chair)? 3 - A Little   5. To walk in hospital room? 3 - A Little   6. Climbing 3-5 steps with a railing? 3 - A Little   Basic Mobility Raw Score (Score out of 24.Lower scores equate to lower levels of function) 19   Total Evaluation Time   Total Evaluation Time (Minutes) 10     "

## 2018-11-02 NOTE — PROGRESS NOTES
[facility] Orthopedic Progress Note  Josie Childs is a 58 year old female    Today's Date:11/2/2018  Admission Date: 10/31/2018  Hip fracture  Hip fracture, right (H)  FEMORAL NECK FRACTURE.  POD # 1 Right total hip arthroplasty for femoral neck fracture.    Patient Seen.  Doing well.  Dressings are clean, dry, and intact.  Circulation, motor and sensory function, intact distally.        PLAN:  Deep venous thrombosis prophylaxis - 325 mg ASA x 6 weeks.   Pain control medication: No changes.  Discharge plan: 1-2 days.      Temp:  [97.3  F (36.3  C)-99  F (37.2  C)] 98.5  F (36.9  C)  Pulse:  [73-89] 89  Heart Rate:  [72-92] 91  Resp:  [14-23] 16  BP: (111-129)/(64-90) 120/71  SpO2:  [92 %-99 %] 96 %      Results for orders placed or performed during the hospital encounter of 10/31/18 (from the past 24 hour(s))   XR Surgery WADE L/T 5 Min Fluoro w Stills    Narrative    SURGERY C-ARM FLUORO LESS THAN 5 MIN W STILLS  11/1/2018 4:35 PM     COMPARISON: None.    HISTORY: Direct anterior right total hip.    NUMBER OF IMAGES ACQUIRED: 2    VIEWS: 2    FLUOROSCOPY TIME: 0.6 minutes.      Impression    IMPRESSION: Right total hip arthroplasty with components in good  position.    JEMAL VANEGAS MD   XR Pelvis w Hip Port Right 1 View    Narrative    PELVIS WITH UNILATERAL HIP ONE VIEW RIGHT  11/1/2018 5:46 PM     HISTORY: Hip arthroplasty.    COMPARISON: October 31, 2018     FINDINGS:    There is a hip arthroplasty in anatomic alignment with  well-seated components.      Impression    IMPRESSION:   Hip arthroplasty in anatomic alignment.    SUELLEN SPENCER MD   Hemoglobin   Result Value Ref Range    Hemoglobin 10.2 (L) 11.7 - 15.7 g/dL   Glucose   Result Value Ref Range    Glucose 105 (H) 70 - 99 mg/dL         Philip Byrne

## 2018-11-02 NOTE — PROGRESS NOTES
[facility] Progress Note  [facility]    Initial visit per request.  Pt struggling with healing process, as she is a very independent woman.   affirmed pt's feelings and highlighted the support she is receiving from her family.   affirmed pt's desire to learn patience for herself in this healing process.  Pt's  encouraged pt to reflect on this time and reminded her that this will be the most frustrating day for her.  Prayed for pt and her family.   will put in a request for on-call  to visit tomorrow, per pt's request.    Yumiko Arredondo   Intern

## 2018-11-02 NOTE — PROGRESS NOTES
[facility]    Hospitalist Progress Note    Date of Service (when I saw the patient): 11/02/2018    Assessment & Plan   Josie Childs is a 58 year old female with hx of anxiety who initially presented to [facility] on 10/31/2018 with acute right hip pain following a fall, and was found to have a right femoral neck fracture. She was transferred to [facility] for expedited surgery. She is now s/p uncomplicated right HILLARY on 11/1.    Acute right femoral neck fracture s/p right HILLARY on 11/1/2018  Tripped over a dog and landed on her right hip. Pelvis and right femur XRs on arrival showed right femoral neck fracture. She underwent uncomplicated right HILLARY by Dr. Chavez on 11/1.  mL.  - Post-op cares as per Ortho    Acute blood loss anemia due to surgery  Hgb 12.7 prior to surgery  - Hgb 10.2 today, will follow    Generalized anxiety disorder  [PTA: venlafaxine 37.g mg daily, alprazolam 0.5 mg qhs]  - Continues on venlafaxine  - Alprazolam ordered as PRN    Dyslipidemia  Chronic and stable on lovastatin    # Pain Assessment:  Current Pain Score 11/2/2018   Patient currently in pain? -   Pain score (0-10) 4   Pain location Hip   Pain descriptors -   - Josie is experiencing pain due to right femoral neck fracture. Pain management was discussed with Josie and her spouse and the plan was created in a collaborative fashion.  Josie's response to the current recommendations: engaged  - Opioid regimen: oxycodone 5 mg q4h prn]  - Response to opioid medications: Reduction of symptoms  - Bowel regimen: senna-docusate  - Pharmacologic adjuvants: Acetaminophen    DVT Prophylaxis: Aspirin as per Ortho  Code Status: Full Code    Disposition: Expected discharge once cleared by Ortho, likely over the weekend    Nerissa Hopkins    Interval History   No events overnight. Right hip pain tolerable. Denies cp/sob, dizziness/lightheadeness, or nausea  - No change in care plan by me. Post-op cares as per  ortho    -Data reviewed today: I reviewed all new labs and imaging results over the last 24 hours. I personally reviewed no images or EKG's today.    Physical Exam   Temp: 98.5  F (36.9  C) Temp src: Oral BP: 120/71 Pulse: 89 Heart Rate: 91 Resp: 18 SpO2: 96 % O2 Device: None (Room air) Oxygen Delivery: 2 LPM  Vitals:    11/02/18 0644   Weight: 70.6 kg (155 lb 10.3 oz)     Vital Signs with Ranges  Temp:  [97.3  F (36.3  C)-99  F (37.2  C)] 98.5  F (36.9  C)  Pulse:  [73-89] 89  Heart Rate:  [72-92] 91  Resp:  [14-23] 18  BP: (111-129)/(64-90) 120/71  SpO2:  [92 %-99 %] 96 %  I/O last 3 completed shifts:  In: 2964 [P.O.:200; I.V.:2764]  Out: 2400 [Urine:2200; Blood:200]    Constitutional: Appears comfortable, NAD  Respiratory: Breathing non-labored. Lungs CTAB - no wheezes, crackles, or rhonchi  Cardiovascular: Heart RRR, no m/r/g. No pedal edema  GI: +BS, abd soft/NT  Skin/Integumen: No rash  Neuro: Alert and appropriate, EVANS  Psych: Calm and cooperative    Medications     sodium chloride 75 mL/hr at 11/01/18 2027     sodium chloride 75 mL/hr at 10/31/18 2230     tranexamic acid         acetaminophen  975 mg Oral Q8H     aspirin  325 mg Oral Daily     lovastatin  40 mg Oral QPM     senna-docusate  1 tablet Oral BID    Or     senna-docusate  2 tablet Oral BID     sodium chloride (PF)  3 mL Intracatheter Q8H     venlafaxine  37.5 mg Oral QAM     Data     Recent Labs  Lab 11/02/18  0644 11/01/18  0637 10/31/18  2255 10/31/18  1559   WBC  --   --   --  11.8*   HGB 10.2*  --  11.2* 12.7   MCV  --   --   --  91   PLT  --   --   --  255   NA  --  139  --  142   POTASSIUM  --  4.0  --  3.8   CHLORIDE  --  109  --  108   CO2  --  24  --  26   BUN  --  8  --  16   CR  --  0.67  --  0.76   ANIONGAP  --  6  --  8   AVILA  --  7.9*  --  8.5   * 101*  --  93       Recent Results (from the past 24 hour(s))   XR Surgery WADE L/T 5 Min Fluoro w Carmen    Narrative    SURGERY C-ARM FLUORO LESS THAN 5 MIN W CARMEN  11/1/2018 4:35  PM     COMPARISON: None.    HISTORY: Direct anterior right total hip.    NUMBER OF IMAGES ACQUIRED: 2    VIEWS: 2    FLUOROSCOPY TIME: 0.6 minutes.      Impression    IMPRESSION: Right total hip arthroplasty with components in good  position.    JEMAL VANEGAS MD   XR Pelvis w Hip Port Right 1 View    Narrative    PELVIS WITH UNILATERAL HIP ONE VIEW RIGHT  11/1/2018 5:46 PM     HISTORY: Hip arthroplasty.    COMPARISON: October 31, 2018     FINDINGS:    There is a hip arthroplasty in anatomic alignment with  well-seated components.      Impression    IMPRESSION:   Hip arthroplasty in anatomic alignment.    SUELLEN SPENCER MD

## 2018-11-02 NOTE — ANESTHESIA POSTPROCEDURE EVALUATION
Patient: Josie Childs    Procedure(s):  ARTHROPLASTY HIP ANTERIOR    Diagnosis:FEMORAL NECK FRACTURE.  Diagnosis Additional Information: No value filed.    Anesthesia Type:  General, LMA    Note:  Anesthesia Post Evaluation    Patient location during evaluation: bedside  Patient participation: Able to fully participate in evaluation  Level of consciousness: awake  Pain management: adequate  Airway patency: patent  Cardiovascular status: acceptable  Respiratory status: acceptable  Hydration status: acceptable  PONV: none     Anesthetic complications: None    Comments: No anesthetic complications noted.         Last vitals:  Vitals:    11/01/18 1845 11/01/18 1915 11/01/18 1945   BP: 126/69 123/70 129/73   Pulse: 85 74 73   Resp: 19 17 17   Temp: 36.4  C (97.5  F)     SpO2: 97% 96% 97%         Electronically Signed By: Lawrence Lutz DO, DO  November 1, 2018  8:55 PM

## 2018-11-02 NOTE — OP NOTE
[facility]  Operative Note    Direct Anterior Approach Total Hip Arthroplasty       Josie Childs MRN# [id number]   YOB: 1960  Procedure Date:  11/1/2018  Age: 58 year old       PREOPERATIVE DIAGNOSIS:  Femoral neck fracture, right hip.    POSTOPERATIVE DIAGNOSIS:  Femoral neck fracture,right hip.    PROCEDURE PERFORMED:    Direct anterior approach right total hip arthroplasty.      SURGEON:  Bar Chavez M.D.    FIRST ASSISTANT:  Christen Clark PA-C.  Her assistance was critical for positioning, retraction during exposure and implantation as well as closure.    ANESTHESIA:  General    EBL: 200    IMPLANTS: [place] Accolade II size 5 std offset 132deg, Tritanium Trident II 48 mm shell with neutral X3 poly for 36mm Biolox -5mm ceramic head    FINDINGS: Displaced femoral neck fracture     INDICATIONS:   Discussed both operative and nonoperative management.  Risks of surgery discussed included but not limited to bleeding, infection, damage to surrounding neurovascular, thigh numbness, leg length difference, dislocation, fracture, need for revision surgery, blood clots, pulmonary embolus, stroke, anesthetic complications and even death.  No guarantees given or implied. Patient thoughtfully acknowledges risks and wishes to proceed.        DESCRIPTION OF PROCEDURE:  The patient was identified in the preoperative area per hospital policy, correct operative site marked, to the OR, Ancef and tranexamic acid given.  General anesthesia induced.  Transferred onto the [place] table.  Positioned appropriately with all bony prominences well padded, chlorhexidine prescrub, ChloraPrep, standard drape, timeout performed per WHO protocol, correctly identifying the patient, operative site, and procedure to be performed.  All in the room agreed.        Using bony landmarks, I prepared the incision for direct anterior approach, dissected through skin and subcutaneous tissue, identifying and incising  fascia, tensor fascia dyan, retracted the muscle bluntly.  Used thyroid ligature to cauterize the circumflex vessels which were carefully identified.  Exposed the femoral neck, incised and tagged the capsule, performing medial release to lesser trochanter,  identified the femoral neck fracture and using a broach as a template, made a cut on the femoral neck approximately 15 mm from the lesser trochanter.  Femoral head removed with cork screw.  Exposed the acetabulum, removed the labrum, and performed superior release between posterior capsule and labrum.  Capsule released into piriformis fossa.  Under fluoroscopy, reamed up sequentially from a 44 to a size 48 touching the rim with 49. Under fluoroscopy, impacted a 48 mm cup in approximately 20 degrees of anteversion and 40 degrees of abduction.  Did directly visualize good bleeding bone prior to this. The cup sat well, rocked the pelvis with movement.  A neutral liner was impacted to accommodate 36 mm head.       Turned attention to the femur, externally rotating, performing femoral releases, placed the hip in extension and adduction.  Used a box osteotome and a lateralizer, broached up sequentially, calcar planned, first reducing and checking with a size 4 stem in place. This showed appropriate leg lengths and undersizedbro fit of the stem.       Thoroughly irrigated the hip and ached up placed the actual Accolade II size 5 stem, 132 neck length.  Trialed and then selected a 36  -5 mm Biolox ceramic head which was impacted after cleaning and drying the [place] taper.  This restored the leg lengths.   Stem filled canal.  It was stable to external pull with a bone hook and neutral and 90 degrees.  Performed a Rush Betadine lavage protocol, thoroughly irrigated hip, vancomycin deep, repaired the hip capsule. Repaired the tensor fascia dyan with 0 Vicryl, 0 Vicryl fatty layer, 2-0 Vicryl subcutaneous, 4-0 Monocryl subcuticular, Dermabond and sterile dressings applied.    Blood loss was 200.  Sponge and needle counts were correct. Transferred safely off the [place] table, stable to PACU.     POSTOPERATIVE PLAN:   1.  Weightbearing as tolerated. Would recommend a walker for the first 2-6 weeks.   2.  Anterior hip precautions.  No abduction pillow necessary.   3.  Physical therapy.   4.  DVT prophylaxis with aspirin enteric-coated p.o. daily x 6 weeks.    5.  Ancef x 24 hours.   6.  PACU x-rays, AP pelvis, 2 views of the right hip.   7.  Follow up with primary care provider for osteoporosis workup and treatment.   8.  Follow up with Dr. Chavez in 2 weeks for wound check. Keep dressing in place until then.  Okay to shower.  No submerging the wound.   9.  Follow up with Dr. Chavez in 8 weeks with 2 views of the right hip.    Bar Chavez M.D.

## 2018-11-02 NOTE — PLAN OF CARE
Problem: Patient Care Overview  Goal: Plan of Care/Patient Progress Review  Outcome: Improving  Patient up with assist of 1 and walker; tolerated activity well, denied nausea or lightheadedness.  Pain managed with IV Dilaudid.  VSS on RA; 2L NC for sleep.  A/Ox4.  Dressing CDI.  CMS intact.  [place] intact and patent; adequate urine output.  Good PO intake.

## 2018-11-02 NOTE — PLAN OF CARE
Problem: Surgery Nonspecified (Adult)  Goal: Signs and Symptoms of Listed Potential Problems Will be Absent, Minimized or Managed (Surgery Nonspecified)  Signs and symptoms of listed potential problems will be absent, minimized or managed by discharge/transition of care (reference Surgery Nonspecified (Adult) CPG).   Outcome: Improving  Pt. A&o, vss, up with one assist and walker, voiding adequate amount in b.r, taking oxycodone for pain, dressing CDI, Will continue to monitor.

## 2018-11-02 NOTE — PLAN OF CARE
Problem: Patient Care Overview  Goal: Plan of Care/Patient Progress Review  Outcome: Improving  A/Ox4. Ax1, walker, GB. WBAT. No precautions. IVSL. IV Dilaudid, Atarax, and Oxy for pain. Aquacel dressing. Continue to monitor.

## 2018-11-02 NOTE — PLAN OF CARE
Problem: Patient Care Overview  Goal: Plan of Care/Patient Progress Review  Outcome: Improving  Pt a/ox4. VS stable on room air. Oxycodone and atarax given for R hip pain. Dressing clean, dry and intact. Pt refused capnography. Adequate urine output. Tolerating regular diet.

## 2018-11-02 NOTE — PLAN OF CARE
"Problem: Patient Care Overview  Goal: Plan of Care/Patient Progress Review  Discharge Planner PT   Patient plan for discharge: Either to home with spouse (stairs to bedroom) or to dtr's home (single level living) - pt to discuss with family  Current status: PT orders received, eval completed, treatment initiated. Pt is a 57yo female seen POD#1 s/p R direct anterior total hip arthroplasty secondary to femoral neck fracture after fall. Pt lives with her spouse in a house with 2 steps to enter, flight to bedroom/walk-in shower. Pt active and IND at baseline, works in [employer] and participates in [employer] 2x/week.    Pt rates pain 2/10 at rest, 5/10 with activity. Educated on WBAT status. Pt participated in supine R HILLARY exercises and tolerated fairly with \"stiffness\". Pt requires min-modA for bed mobility, CGA for sit<>stand transfers and 80' ambulation with FWW. Educated on HILLARY HEP.     Next tx: Develop standing TE, Stairs with cane, platform step as discussed with pt    Barriers to return to prior living situation: Level of assist, stairs (to be assessed)  Recommendations for discharge: Home with family assist for household tasks/errands, supervision on stairs; pt's dtr is an OT and has obtained a FWW and shower chair for pt to use  Rationale for recommendations: Anticipate pt will progress to safely disch to home with family d/t pt's active PLOF and current mobility, pending safe stair trial.       Entered by: Marce Lutz 11/02/2018 8:47 AM           "

## 2018-11-03 ENCOUNTER — APPOINTMENT (OUTPATIENT)
Dept: PHYSICAL THERAPY | Facility: CLINIC | Age: 58
DRG: 470 | End: 2018-11-03
Attending: INTERNAL MEDICINE
Payer: COMMERCIAL

## 2018-11-03 ENCOUNTER — APPOINTMENT (OUTPATIENT)
Dept: OCCUPATIONAL THERAPY | Facility: CLINIC | Age: 58
DRG: 470 | End: 2018-11-03
Attending: PHYSICIAN ASSISTANT
Payer: COMMERCIAL

## 2018-11-03 VITALS
TEMPERATURE: 99.7 F | OXYGEN SATURATION: 97 % | BODY MASS INDEX: 25.9 KG/M2 | RESPIRATION RATE: 16 BRPM | SYSTOLIC BLOOD PRESSURE: 156 MMHG | HEART RATE: 93 BPM | WEIGHT: 155.65 LBS | DIASTOLIC BLOOD PRESSURE: 86 MMHG

## 2018-11-03 LAB
GLUCOSE SERPL-MCNC: 119 MG/DL (ref 70–99)
HGB BLD-MCNC: 10.6 G/DL (ref 11.7–15.7)

## 2018-11-03 PROCEDURE — 97110 THERAPEUTIC EXERCISES: CPT | Mod: GP

## 2018-11-03 PROCEDURE — 36415 COLL VENOUS BLD VENIPUNCTURE: CPT | Performed by: PHYSICIAN ASSISTANT

## 2018-11-03 PROCEDURE — 85018 HEMOGLOBIN: CPT | Performed by: PHYSICIAN ASSISTANT

## 2018-11-03 PROCEDURE — 82947 ASSAY GLUCOSE BLOOD QUANT: CPT | Performed by: PHYSICIAN ASSISTANT

## 2018-11-03 PROCEDURE — 99239 HOSP IP/OBS DSCHRG MGMT >30: CPT | Performed by: HOSPITALIST

## 2018-11-03 PROCEDURE — 97165 OT EVAL LOW COMPLEX 30 MIN: CPT | Mod: GO | Performed by: OCCUPATIONAL THERAPIST

## 2018-11-03 PROCEDURE — 97535 SELF CARE MNGMENT TRAINING: CPT | Mod: GO | Performed by: OCCUPATIONAL THERAPIST

## 2018-11-03 PROCEDURE — 25000132 ZZH RX MED GY IP 250 OP 250 PS 637: Performed by: PHYSICIAN ASSISTANT

## 2018-11-03 PROCEDURE — 40000133 ZZH STATISTIC OT WARD VISIT: Performed by: OCCUPATIONAL THERAPIST

## 2018-11-03 PROCEDURE — 25000132 ZZH RX MED GY IP 250 OP 250 PS 637: Performed by: HOSPITALIST

## 2018-11-03 PROCEDURE — 40000193 ZZH STATISTIC PT WARD VISIT

## 2018-11-03 PROCEDURE — 97116 GAIT TRAINING THERAPY: CPT | Mod: GP

## 2018-11-03 RX ORDER — METHOCARBAMOL 500 MG/1
500 TABLET, FILM COATED ORAL 4 TIMES DAILY PRN
Qty: 60 TABLET | Refills: 0 | Status: SHIPPED | OUTPATIENT
Start: 2018-11-03

## 2018-11-03 RX ADMIN — VENLAFAXINE HYDROCHLORIDE 37.5 MG: 37.5 CAPSULE, EXTENDED RELEASE ORAL at 09:08

## 2018-11-03 RX ADMIN — OXYCODONE HYDROCHLORIDE 10 MG: 5 TABLET ORAL at 13:29

## 2018-11-03 RX ADMIN — OXYCODONE HYDROCHLORIDE 10 MG: 5 TABLET ORAL at 06:12

## 2018-11-03 RX ADMIN — SENNOSIDES AND DOCUSATE SODIUM 2 TABLET: 8.6; 5 TABLET ORAL at 09:08

## 2018-11-03 RX ADMIN — ACETAMINOPHEN 975 MG: 325 TABLET, FILM COATED ORAL at 06:12

## 2018-11-03 RX ADMIN — ACETAMINOPHEN 975 MG: 325 TABLET, FILM COATED ORAL at 13:29

## 2018-11-03 RX ADMIN — ASPIRIN 325 MG: 325 TABLET, DELAYED RELEASE ORAL at 09:08

## 2018-11-03 RX ADMIN — OXYCODONE HYDROCHLORIDE 10 MG: 5 TABLET ORAL at 01:47

## 2018-11-03 ASSESSMENT — ACTIVITIES OF DAILY LIVING (ADL)
PREVIOUS_RESPONSIBILITIES: MEAL PREP;HOUSEKEEPING;LAUNDRY;SHOPPING;YARDWORK;MEDICATION MANAGEMENT;FINANCES;DRIVING;WORK;CHILD CARE
ADLS_ACUITY_SCORE: 10

## 2018-11-03 NOTE — PROGRESS NOTES
11/03/18 1004   Quick Adds   Type of Visit Initial Occupational Therapy Evaluation   Living Environment   Lives With spouse   Living Arrangements house   Home Accessibility stairs to enter home   Number of Stairs to Enter Home 2   Number of Stairs Within Home 14  (R railing)   Transportation Available family or friend will provide;car   Living Environment Comment Pt reports she will be staying with her john who is an OT at [employer]; at john;'s house two platform steps to enter, with handhold available on one stair, all living on main level after that.    Self-Care   Dominant Hand right   Usual Activity Tolerance excellent   Current Activity Tolerance good   Regular Exercise yes   Activity/Exercise Type walking  ([place])   Exercise Amount/Frequency 2 times/wk   Equipment Currently Used at Home none   Activity/Exercise/Self-Care Comment Pt is also a  and has a physical job   Functional Level Prior   Ambulation 0-->independent   Transferring 0-->independent   Toileting 0-->independent   Bathing 0-->independent   Dressing 0-->independent   Eating 0-->independent   Communication 0-->understands/communicates without difficulty   Swallowing 0-->swallows foods/liquids without difficulty   Cognition 0 - no cognition issues reported   Fall history within last six months yes   Number of times patient has fallen within last six months 2   Which of the above functional risks had a recent onset or change? ambulation;transferring   Prior Functional Level Comment Pt reports she feels her glasses are contributing to falls but was IND in all ADLs and IADLs, works outside the home and cares for her grandchildren   General Information   Onset of Illness/Injury or Date of Surgery - Date 10/31/18   Referring Physician Christen Clark PA-C   Patient/Family Goals Statement To return to work and careing for her grandchildren   Additional Occupational Profile Info/Pertinent History of Current Problem Pt is a 59 yo female  with fibromyalsia who sustained a femoral neck fracture when she tripped over a dog. Pt underwent a R HILLARY with direct anterior approach.    Precautions/Limitations fall precautions   Weight-Bearing Status - RLE weight-bearing as tolerated   General Observations No hip precautions secondary to approach   Cognitive Status Examination   Orientation orientation to person, place and time   Level of Consciousness alert   Able to Follow Commands WNL/WFL   Visual Perception   Visual Perception Wears glasses   Visual Perception Comments pt feels her glasses have made her more likely to fall, she is having difficulty with perception with the progressive lenses/bifocals   Sensory Examination   Sensory Quick Adds No deficits were identified   Pain Assessment   Patient Currently in Pain No   Range of Motion (ROM)   ROM Comment BUE WFL for ADLs   Strength   Strength Comments BUE WFL for ADLs   Hand Strength   Hand Strength Comments BUE WFL for ADLs   Coordination   Upper Extremity Coordination No deficits were identified   Transfer Skill: Bed to Chair/Chair to Bed   Level of [place]: Bed to Chair contact guard   Physical Assist/Nonphysical Assist: Bed to Chair supervision   Weight-Bearing Restrictions weight-bearing as tolerated   Assistive Device - Transfer Skill Bed to Chair Chair to Bed Rehab Eval rolling walker   Transfer Skill: Sit to Stand   Level of [place]: Sit/Stand stand-by assist   Physical Assist/Nonphysical Assist: Sit/Stand supervision;verbal cues   Transfer Skill: Sit to Stand weight-bearing as tolerated   Assistive Device for Transfer: Sit/Stand rolling walker   Bathing   Level of [place] minimum assist (75% patients effort)   Upper Body Dressing   Level of [place]: Dress Upper Body independent   Lower Body Dressing   Level of [place]: Dress Lower Body moderate assist (50% patients effort)   Toileting   Level of [place]: Toilet minimum assist (75% patients effort)   Grooming   Level  "of [place]: Grooming independent   Eating/Self Feeding   Level of [place]: Eating independent   Instrumental Activities of Daily Living (IADL)   Previous Responsibilities meal prep;housekeeping;laundry;shopping;yardwork;medication management;finances;driving;work;   Activities of Daily Living Analysis   Impairments Contributing to Impaired Activities of Daily Living flexibility decreased;pain;ROM decreased;strength decreased   General Therapy Interventions   Planned Therapy Interventions ADL retraining;transfer training;progressive activity/exercise;risk factor education   Clinical Impression   Criteria for Skilled Therapeutic Interventions Met yes, treatment indicated   OT Diagnosis reduced IND in ADLS   Influenced by the following impairments flexibility decreased;pain;ROM decreased;strength decreased   Assessment of Occupational Performance 3-5 Performance Deficits   Identified Performance Deficits bed mobility, LE dressing, bathing, transfers/ambulation, IADLs   Clinical Decision Making (Complexity) Low complexity   Therapy Frequency daily   Predicted Duration of Therapy Intervention (days/wks) 2 days   Anticipated Equipment Needs at Discharge long shoe horn;reacher;sock aide;raised toilet seat;bath sponge   Anticipated Discharge Disposition Home with Assist   Risks and Benefits of Treatment have been explained. Yes   Patient, Family & other staff in agreement with plan of care Yes   [facility] TM \"6 Clicks\"   2016, Trustees of [facility], under license to [employer].  All rights reserved.   6 Clicks Short Forms Daily Activity Inpatient Short Form   [facility]  \"6 Clicks\" Daily Activity Inpatient Short Form   1. Putting on and taking off regular lower body clothing? 2 - A Lot   2. Bathing (including washing, rinsing, drying)? 3 - A Little   3. Toileting, which includes using toilet, bedpan or urinal? 3 - A Little   4. Putting on and taking off regular " upper body clothing? 4 - None   5. Taking care of personal grooming such as brushing teeth? 4 - None   6. Eating meals? 4 - None   Daily Activity Raw Score (Score out of 24.Lower scores equate to lower levels of function) 20   Total Evaluation Time   Total Evaluation Time (Minutes) 10

## 2018-11-03 NOTE — PLAN OF CARE
Problem: Patient Care Overview  Goal: Plan of Care/Patient Progress Review  Outcome: Adequate for Discharge Date Met: 11/03/18  Pt A&Ox4, VSS, CMS intact, dressing C,D,I, up with assist of one and walker to BR, voiding well, tolerating regular diet, oxycodone/tylenol for pain management. Pt discharged to home with spouse and daughter. Pt verbalizes understanding of discharge instructions/medications/follow up plan. Spouse and daughter present during education.

## 2018-11-03 NOTE — PROGRESS NOTES
Doing well this AM  Pain minimal  afvss  Dressing cdi  Stable  wbat  Pt/ot  discharge home with family tomorrow

## 2018-11-03 NOTE — DISCHARGE SUMMARY
[facility]  Discharge Summary        Josie Childs MRN# [id number]   YOB: 1960 Age: 58 year old     Date of Admission:  10/31/2018  Date of Discharge:  11/3/2018  Admitting Physician:  Lizandro Pollard MD  Discharge Physician: Eren Cancino MD  Discharging Service: Hospitalist     Primary Provider: Errol Pineda  Primary Care Physician Phone Number: [phone]         Discharge Diagnoses/Problem Oriented Hospital Course (Providers):    Josie Childs was admitted on 10/31/2018 by Lizandro Pollard MD and I would refer you to their history and physical.  The following problems were addressed during her hospitalization:   Josie Childs is a 58 year old female with hx of anxiety who initially presented to [facility] on 10/31/2018 with acute right hip pain following a fall, and was found to have a right femoral neck fracture. She was transferred to [facility] for expedited surgery. She is now s/p uncomplicated right HILLARY on 11/1.     Acute right femoral neck fracture s/p right HILLARY on 11/1/2018  Tripped over a dog and landed on her right hip. Pelvis and right femur XRs on arrival showed right femoral neck fracture. She underwent uncomplicated right HILLARY by Dr. Chavez on 11/1.  mL.     Acute blood loss anemia due to surgery  Hgb 12.7 prior to surgery  - Hgb 10.6 at the time of discharge     Generalized anxiety disorder  [PTA: venlafaxine 37.g mg daily, alprazolam 0.5 mg qhs]  - Continues on venlafaxine  - Alprazolam ordered as PRN     Dyslipidemia  Chronic and stable on lovastatin        Code Status:      Full Code        Brief Hospital Stay Summary Sent Home With Patient in AVS:        Reason for your hospital stay       Right direct anterior total hip arthroplasty - done due to femoral neck   fracture                        Important Results:      See below        Pending Results:        Unresulted Labs Ordered in the Past 30 Days of this  Admission     No orders found from 9/1/2018 to 11/1/2018.            Discharge Instructions and Follow-Up:      Follow-up Appointments     Follow-up and recommended labs and tests        Follow up with Dr. Chavez , at (location with clinic name or city)   [facility] ([place] or [place], within 2-3 weeks  to   evaluate after surgery. No follow up labs or test are needed.  Call Christen for appointment [phone]                      Discharge Disposition:      Discharged to home        Discharge Medications:        Current Discharge Medication List      START taking these medications    Details   acetaminophen (TYLENOL) 325 MG tablet Take 2 tablets (650 mg) by mouth every 6 hours as needed for mild pain  Qty: 40 tablet, Refills: 0    Associated Diagnoses: Closed fracture of right hip, initial encounter (H)      aspirin 325 MG EC tablet Take 1 tablet (325 mg) by mouth daily  Qty: 30 tablet, Refills: 0    Associated Diagnoses: Closed fracture of right hip, initial encounter (H)      hydrOXYzine (ATARAX) 25 MG tablet Take 1 tablet (25 mg) by mouth every 6 hours as needed for itching  Qty: 30 tablet, Refills: 0    Associated Diagnoses: Closed fracture of right hip, initial encounter (H)      methocarbamol (ROBAXIN) 500 MG tablet Take 1 tablet (500 mg) by mouth 4 times daily as needed for muscle spasms  Qty: 60 tablet, Refills: 0    Associated Diagnoses: Closed fracture of right hip, initial encounter (H)      ondansetron (ZOFRAN-ODT) 4 MG ODT tab Take 1 tablet (4 mg) by mouth every 6 hours as needed for nausea or vomiting  Qty: 10 tablet, Refills: 0    Associated Diagnoses: Closed fracture of right hip, initial encounter (H)      oxyCODONE IR (ROXICODONE) 5 MG tablet Take 1 tablet (5 mg) by mouth every 4 hours as needed for moderate to severe pain  Qty: 30 tablet, Refills: 0    Associated Diagnoses: Closed fracture of right hip, initial encounter (H)      senna-docusate (SENOKOT-S;PERICOLACE) 8.6-50 MG  per tablet Take 2 tablets by mouth 2 times daily  Qty: 30 tablet, Refills: 0    Associated Diagnoses: Closed fracture of right hip, initial encounter (H)         CONTINUE these medications which have NOT CHANGED    Details   ALPRAZolam (XANAX) 0.5 MG tablet Take 0.5 mg by mouth At Bedtime      hydrocortisone (PROCTOSOL HC) 2.5 % cream Place rectally as needed for hemorrhoids      lovastatin (MEVACOR) 40 MG tablet Take 40 mg by mouth every evening   Qty: 90 tablet, Refills: 3    Associated Diagnoses: Hyperlipidemia LDL goal < 130; CARDIOVASCULAR SCREENING; LDL GOAL LESS THAN 130      meclizine (ANTIVERT) 25 MG tablet Take 1 tablet (25 mg) by mouth every 6 hours as needed for dizziness  Qty: 30 tablet, Refills: 1      Multiple Vitamins-Minerals (MULTIVITAMIN ADULT PO) Take 1 tablet by mouth daily      polyethylene glycol (MIRALAX/GLYCOLAX) Packet Take 1 packet by mouth daily as needed for constipation      venlafaxine (EFFEXOR-XR) 37.5 MG 24 hr capsule Take 37.5 mg by mouth every morning               Allergies:         Allergies   Allergen Reactions     Carafate [Sucralfate] Hives     Nexium [Esomeprazole Magnesium Trihydrate] Hives     Tegretol [Carbamazepine]            Consultations This Hospital Stay:      Consultation during this admission received from orthopedics        Condition and Physical on Discharge:      Discharge condition: Stable   Vitals: Heart Rate: 77, Blood pressure 98/57, pulse 93, temperature 97.7  F (36.5  C), temperature source Oral, resp. rate 16, weight 70.6 kg (155 lb 10.3 oz), SpO2 92 %.      Constitutional:           Appears comfortable, NAD  Respiratory: Breathing non-labored. Lungs CTAB - no wheezes, crackles, or rhonchi  Cardiovascular: Heart RRR, no m/r/g. No pedal edema  GI: +BS, abd soft/NT  Skin/Integumen: No rash  Neuro: Alert and appropriate, EVANS  Psych: Calm and cooperative         Discharge Time:      Discharge process has taken greater than 30 minutes.        Image Results  From This Hospital Stay (For Non-EPIC Providers):        Results for orders placed or performed during the hospital encounter of 10/31/18   XR Surgery WADE L/T 5 Min Fluoro w Stills    Narrative    SURGERY C-ARM FLUORO LESS THAN 5 MIN W STILLS  11/1/2018 4:35 PM     COMPARISON: None.    HISTORY: Direct anterior right total hip.    NUMBER OF IMAGES ACQUIRED: 2    VIEWS: 2    FLUOROSCOPY TIME: 0.6 minutes.      Impression    IMPRESSION: Right total hip arthroplasty with components in good  position.    JEMAL VANEGAS MD   XR Pelvis w Hip Port Right 1 View    Narrative    PELVIS WITH UNILATERAL HIP ONE VIEW RIGHT  11/1/2018 5:46 PM     HISTORY: Hip arthroplasty.    COMPARISON: October 31, 2018     FINDINGS:    There is a hip arthroplasty in anatomic alignment with  well-seated components.      Impression    IMPRESSION:   Hip arthroplasty in anatomic alignment.    SUELLEN SPENCER MD           Most Recent Lab Results In EPIC (For Non-EPIC Providers):    Most Recent 3 CBC's:  Recent Labs   Lab Test  11/03/18   0630  11/02/18   0644  10/31/18   2255  10/31/18   1559  09/15/17   0855   WBC   --    --    --   11.8*  7.9   HGB  10.6*  10.2*  11.2*  12.7  13.9   MCV   --    --    --   91  92   PLT   --    --    --   255  229      Most Recent 3 BMP's:  Recent Labs   Lab Test  11/03/18 0630  11/02/18   0644  11/01/18   0637  10/31/18   1559  09/15/17   0855   NA   --    --   139  142  140   POTASSIUM   --    --   4.0  3.8  3.7   CHLORIDE   --    --   109  108  105   CO2   --    --   24  26  28   BUN   --    --   8  16  16   CR   --    --   0.67  0.76  0.75   ANIONGAP   --    --   6  8  7   AVILA   --    --   7.9*  8.5  9.4   GLC  119*  105*  101*  93  106*     Most Recent 3 Troponin's:No lab results found.    Invalid input(s): TROP, TROPONINIES  Most Recent 3 INR's:No lab results found.  Most Recent 2 LFT's:  Recent Labs   Lab Test  03/26/11   0818  12/31/10   0920   AST   --   24   ALT  16  32   ALKPHOS   --   36*   BILITOTAL    --   0.5     Most Recent Cholesterol Panel:  Recent Labs   Lab Test  03/26/11   0818   CHOL  217*   LDL  131*   HDL  73   TRIG  64     Most Recent 6 Bacteria Isolates From Any Culture (See EPIC Reports for Culture Details):No lab results found.  Most Recent TSH, T4 and HgbA1c: No lab results found.

## 2018-11-03 NOTE — PLAN OF CARE
Problem: Patient Care Overview  Goal: Plan of Care/Patient Progress Review  Outcome: Improving  Patient is A&O, VSS on RA, CMS intact, regular diet, up with assist of 1, voiding adequately in the bathroom, and dressing CDI. Oxycodone given for pain control, will continue to monitor.

## 2018-11-03 NOTE — PROGRESS NOTES
[facility]    Hospitalist Progress Note    Date of Service (when I saw the patient): 11/03/2018    Assessment & Plan   Josie Childs is a 58 year old female with hx of anxiety who initially presented to [facility] on 10/31/2018 with acute right hip pain following a fall, and was found to have a right femoral neck fracture. She was transferred to [facility] for expedited surgery. She is now s/p uncomplicated right HILLARY on 11/1.    Acute right femoral neck fracture s/p right HILLARY on 11/1/2018  Tripped over a dog and landed on her right hip. Pelvis and right femur XRs on arrival showed right femoral neck fracture. She underwent uncomplicated right HILLARY by Dr. Chavez on 11/1.  mL.  - Post-op cares as per Ortho    Acute blood loss anemia due to surgery  Hgb 12.7 prior to surgery  - Hgb 10.6 today, will follow    Generalized anxiety disorder  [PTA: venlafaxine 37.g mg daily, alprazolam 0.5 mg qhs]  - Continues on venlafaxine  - Alprazolam ordered as PRN    Dyslipidemia  Chronic and stable on lovastatin    DVT Prophylaxis: Aspirin as per Ortho  Code Status: Full Code    Disposition: Expected discharge once cleared by Ortho, likely over the weekend    Eren Kriss Cancino    Interval History   No events overnight. Right hip pain tolerable. Denies cp/sob, dizziness/lightheadeness, or nausea  - No change in care plan by me. Post-op cares as per ortho    -Data reviewed today: I reviewed all new labs and imaging results over the last 24 hours. I personally reviewed no images or EKG's today.    Physical Exam   Temp: 97.7  F (36.5  C) Temp src: Oral BP: 98/57 Pulse: 93 Heart Rate: 77 Resp: 16 SpO2: 92 % O2 Device: None (Room air)    Vitals:    11/02/18 0644   Weight: 70.6 kg (155 lb 10.3 oz)     Vital Signs with Ranges  Temp:  [97.7  F (36.5  C)-98.8  F (37.1  C)] 97.7  F (36.5  C)  Pulse:  [80-93] 93  Heart Rate:  [77-98] 77  Resp:  [16] 16  BP: ()/(57-68) 98/57  SpO2:  [92 %-96 %] 92  %  I/O last 3 completed shifts:  In: 300 [P.O.:300]  Out: -     Constitutional: Appears comfortable, NAD  Respiratory: Breathing non-labored. Lungs CTAB - no wheezes, crackles, or rhonchi  Cardiovascular: Heart RRR, no m/r/g. No pedal edema  GI: +BS, abd soft/NT  Skin/Integumen: No rash  Neuro: Alert and appropriate, EVANS  Psych: Calm and cooperative    Medications     sodium chloride 75 mL/hr at 11/01/18 2027     sodium chloride 75 mL/hr at 10/31/18 2230       acetaminophen  975 mg Oral Q8H     aspirin  325 mg Oral Daily     lovastatin  40 mg Oral QPM     senna-docusate  1 tablet Oral BID    Or     senna-docusate  2 tablet Oral BID     sodium chloride (PF)  3 mL Intracatheter Q8H     venlafaxine  37.5 mg Oral QAM     Data     Recent Labs  Lab 11/03/18  0630 11/02/18  0644 11/01/18  0637 10/31/18  2255 10/31/18  1559   WBC  --   --   --   --  11.8*   HGB 10.6* 10.2*  --  11.2* 12.7   MCV  --   --   --   --  91   PLT  --   --   --   --  255   NA  --   --  139  --  142   POTASSIUM  --   --  4.0  --  3.8   CHLORIDE  --   --  109  --  108   CO2  --   --  24  --  26   BUN  --   --  8  --  16   CR  --   --  0.67  --  0.76   ANIONGAP  --   --  6  --  8   AVILA  --   --  7.9*  --  8.5   * 105* 101*  --  93       No results found for this or any previous visit (from the past 24 hour(s)).

## 2018-11-03 NOTE — PLAN OF CARE
Problem: Patient Care Overview  Goal: Plan of Care/Patient Progress Review  Discharge Planner PT   Patient plan for discharge: Either to home with spouse (stairs to bedroom) or to dtr's home (single level living) - pt to discuss with family  Current status: Patient progressing well. Performed supine <> sit with Min A x 1. Sit to stand transfer to FWW completed with CGA. Ambulated 10 ft x 1 and 125 ft x 1 with FWW and CGA progressing to SBA; step through, reciprocal pattern. Went up/down 1 platform stair x 2 with CGA. Went up/down 3 stairs x 3 with CGA; 1x with bilateral railings and 2x with SEC and right railing; tolerated well. Up in chair at end of session.   Barriers to return to prior living situation: Level of assist, stairs   Recommendations for discharge: Home with family assist for household tasks/errands, supervision on stairs per plan established by the PT.  Rationale for recommendations: Anticipate pt will progress to safely disch to home with family d/t pt's active PLOF and current mobility. Pt's dtr is an OT and has obtained a FWW and shower chair to use       Entered by: Echo Gibson 11/03/2018 10:55 AM       Patient discharged to home. PT goals not met.      Physical Therapy Discharge Summary    Reason for therapy discharge:    Discharged to home.    Progress towards therapy goal(s). See goals on Care Plan in [facility] electronic health record for goal details.  Goals not met.  Barriers to achieving goals:   limited tolerance for therapy.    Therapy recommendation(s):    No further therapy is recommended.  Continue home exercise program.

## 2018-11-03 NOTE — PLAN OF CARE
Problem: Patient Care Overview  Goal: Plan of Care/Patient Progress Review  Discharge Planner OT   Patient plan for discharge: To daughter's house (her daughter is an OT) for 1 week, then home with her   Current status: Orders received, eval completed, treatment initiated. 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 Pt has high level of support from family, her daughter is arranging AE. Pt completed transfers with CGA and VC, ambulation is slow, steady, SB-CGA. Pt completed ed in AE for LE dressing, completed doffing/donning socks with reacher/sock aid with SBA. Pt rates pain at rest 3/10 and with activity 4/10. Pt reports she feels her glasses are putting her at risk for falls (progressive bifocals), provided compensatory strategies to increase safety.   Barriers to return to prior living situation: Stairs, level of asssit  Recommendations for discharge: Home with assist and AE  Rationale for recommendations: Pt is progressing, has a high level of assist available and her daughter is able to assist with AE. Pt would benefit from continued IP services to maximize safety and IND in ADLs/mobility.        Entered by: Christen Araujo 11/03/2018 10:51 AM

## 2018-11-04 NOTE — PLAN OF CARE
Problem: Patient Care Overview  Goal: Plan of Care/Patient Progress Review  Occupational Therapy Discharge Summary    Reason for therapy discharge:    Discharged to home.    Progress towards therapy goal(s). See goals on Care Plan in [facility] electronic health record for goal details.  Goals partially met.  Barriers to achieving goals:   discharge from facility.    Therapy recommendation(s):    No further therapy is recommended.

## 2024-02-25 NOTE — PROGRESS NOTES
[facility]    Hospitalist Progress Note    Date of Service (when I saw the patient): 11/01/2018    Assessment & Plan   Josie Childs is a 58 year old female with hx of anxiety who initially presented to [facility] on 10/31/2018 with acute right hip pain following a fall, and was found to have a right femoral neck fracture. She was transferred to [facility] for expedited surgery.    Acute right femoral neck fracture  Tripped over a dog and landed on her right hip. Pelvis and right femur XRs on arrival showed right femoral neck fracture.  - Surgery today, post-op cares as per Ortho    Generalized anxiety disorder  [PTA: venlafaxine 37.g mg daily, alprazolam 0.5 mg qhs]  - Continues on venlafaxine  - Alprazolam ordered as PRN    Dyslipidemia  Chronic and stable on lovastatin    # Pain Assessment:  Current Pain Score 11/1/2018   Patient currently in pain? denies   Pain score (0-10) 0   Pain location -   Pain descriptors -   - Josie is experiencing pain due to right femoral neck fracture. Pain management was discussed with Josie and her spouse and the plan was created in a collaborative fashion.  Josie's response to the current recommendations: engaged  - Opioid regimen: oxycodone 5 mg q4h prn]  - Response to opioid medications: Reduction of symptoms  - Bowel regimen: senna-docusate  - Pharmacologic adjuvants: Acetaminophen    DVT Prophylaxis: Pneumatic Compression Devices  Code Status: Full Code    Disposition: Expected discharge once cleared by Ortho, likely over the weekend    Nerissa Hopkins    Interval History   No events overnight. Struggling with RLE pain and dry mouth.  - Surgery today  - No change in care plan by me    -Data reviewed today: I reviewed all new labs and imaging results over the last 24 hours. I personally reviewed no images or EKG's today.    Physical Exam   Temp: 99.1  F (37.3  C) Temp src: Oral BP: 120/70 Pulse: 79   Resp: 16 SpO2: 92 % O2 Device: None (Room air)     There were no vitals filed for this visit.  Vital Signs with Ranges  Temp:  [98.5  F (36.9  C)-99.1  F (37.3  C)] 99.1  F (37.3  C)  Pulse:  [79-81] 79  Resp:  [16-18] 16  BP: (103-147)/(64-88) 120/70  SpO2:  [92 %-100 %] 92 %  I/O last 3 completed shifts:  In: -   Out: 750 [Urine:750]    Constitutional: Appears comfortable, NAD  Respiratory: Breathing non-labored. Lungs CTAB - no wheezes, crackles, or rhonchi  Cardiovascular: Heart RRR, no m/r/g. No pedal edema  GI: +BS, abd soft/NT  Skin/Integumen: No rash  Neuro: Alert and appropriate, EVANS  Psych: Calm and cooperative    Medications     lactated ringers 25 mL/hr at 11/01/18 1300     sodium chloride 75 mL/hr at 10/31/18 2230     tranexamic acid         ceFAZolin  1 g Intravenous See Admin Instructions     [Auto Hold] lovastatin  40 mg Oral QPM     [Auto Hold] senna-docusate  1 tablet Oral BID    Or     [Auto Hold] senna-docusate  2 tablet Oral BID     Tranexamic Acid  1 g Intravenous Once     [Auto Hold] venlafaxine  37.5 mg Oral QAM     Data     Recent Labs  Lab 11/01/18  0637 10/31/18  2255 10/31/18  1559   WBC  --   --  11.8*   HGB  --  11.2* 12.7   MCV  --   --  91   PLT  --   --  255     --  142   POTASSIUM 4.0  --  3.8   CHLORIDE 109  --  108   CO2 24  --  26   BUN 8  --  16   CR 0.67  --  0.76   ANIONGAP 6  --  8   AVILA 7.9*  --  8.5   *  --  93       Recent Results (from the past 24 hour(s))   XR Femur Right 2 Views    Narrative    XR PELVIS 1/2 VW,   XR FEMUR RT 2 VW 10/31/2018 3:28 PM    HISTORY: Hip pain.    COMPARISON: None.    FINDINGS: Right femoral neck fracture. Left proximal femur and pelvis  appear intact. Distal right femur appears normal.      Impression    IMPRESSION: Right femoral neck fracture.    ELANA CARPENTER MD   XR Pelvis 1/2 Views    Narrative    XR PELVIS 1/2 VW,   XR FEMUR RT 2 VW 10/31/2018 3:28 PM    HISTORY: Hip pain.    COMPARISON: None.    FINDINGS: Right femoral neck fracture. Left proximal femur and pelvis  appear  intact. Distal right femur appears normal.      Impression    IMPRESSION: Right femoral neck fracture.    ELANA CARPENTER MD          MED

## (undated) DEVICE — KIT PATIENT CARE HANA TABLE PROFX SUPINE 6855

## (undated) DEVICE — GLOVE PROTEXIS POWDER FREE 8.0 ORTHOPEDIC 2D73ET80

## (undated) DEVICE — SOL WATER IRRIG 1000ML BOTTLE 2F7114

## (undated) DEVICE — SU MONOCRYL 4-0 PS-2 18" UND Y496G

## (undated) DEVICE — SU ETHIBOND 2 V-37 4X30" MX69G

## (undated) DEVICE — PACK TOTAL HIP W/U DRAPE SOP15HUFSC

## (undated) DEVICE — ESU PENCIL W/SMOKE EVAC NEPTUNE STRYKER 0703-046-000

## (undated) DEVICE — DRAPE CONVERTORS U-DRAPE 60X72" 8476

## (undated) DEVICE — ESU GROUND PAD UNIVERSAL W/O CORD

## (undated) DEVICE — Device

## (undated) DEVICE — LINEN TOWEL PACK X5 5464

## (undated) DEVICE — SUCTION TIP YANKAUER STR K87

## (undated) DEVICE — GLOVE PROTEXIS BLUE W/NEU-THERA 8.0  2D73EB80

## (undated) DEVICE — DRAPE STERI U 1015

## (undated) DEVICE — SU VICRYL 2-0 CT-1 27" UND J259H

## (undated) DEVICE — SU VICRYL 0 CT-1 27" J340H

## (undated) DEVICE — GLOVE PROTEXIS BLUE W/NEU-THERA 6.5  2D73EB65

## (undated) DEVICE — GLOVE PROTEXIS POWDER FREE 6.5 ORTHOPEDIC 2D73ET65

## (undated) DEVICE — SUCTION IRR SYSTEM W/O TIP INTERPULSE HANDPIECE 0210-100-000

## (undated) DEVICE — DRAPE C-ARM 60X42" 1013

## (undated) DEVICE — DRSG TEGADERM 4X4 3/4" 1626

## (undated) DEVICE — MANIFOLD NEPTUNE 4 PORT 700-20

## (undated) DEVICE — SOL NACL 0.9% IRRIG 1000ML BOTTLE 07138-09

## (undated) DEVICE — DRAPE SHEET REV FOLD 3/4 9349

## (undated) DEVICE — SOLUTION WOUND CLEANSING 3/4OZ 10% PVP EA-L3011FB-50

## (undated) DEVICE — BONE CLEANING TIP INTERPULSE  0210-010-000

## (undated) DEVICE — DRAPE IOBAN ISOLATION VERTICAL 320X21CM 6617

## (undated) DEVICE — BLADE SAW SAGITTAL STRK 19.5X95X1.27MM 2108-109-000S15

## (undated) DEVICE — PREP CHLORAPREP 26ML TINTED ORANGE  260815

## (undated) DEVICE — ESU LIGASURE OPEN SEALER/DIVIDER SM JAW 16.5MM LF1212A

## (undated) DEVICE — HOOD FLYTE W/PEELAWAY 408-800-100

## (undated) RX ORDER — LIDOCAINE HYDROCHLORIDE 20 MG/ML
INJECTION, SOLUTION EPIDURAL; INFILTRATION; INTRACAUDAL; PERINEURAL
Status: DISPENSED
Start: 2018-11-01

## (undated) RX ORDER — VANCOMYCIN HYDROCHLORIDE 1 G/20ML
INJECTION, POWDER, LYOPHILIZED, FOR SOLUTION INTRAVENOUS
Status: DISPENSED
Start: 2018-11-01

## (undated) RX ORDER — CEFAZOLIN SODIUM 2 G/100ML
INJECTION, SOLUTION INTRAVENOUS
Status: DISPENSED
Start: 2018-11-01

## (undated) RX ORDER — BUPIVACAINE HYDROCHLORIDE AND EPINEPHRINE 5; 5 MG/ML; UG/ML
INJECTION, SOLUTION EPIDURAL; INTRACAUDAL; PERINEURAL
Status: DISPENSED
Start: 2018-11-01

## (undated) RX ORDER — ONDANSETRON 2 MG/ML
INJECTION INTRAMUSCULAR; INTRAVENOUS
Status: DISPENSED
Start: 2018-11-01

## (undated) RX ORDER — CEFAZOLIN SODIUM 1 G/3ML
INJECTION, POWDER, FOR SOLUTION INTRAMUSCULAR; INTRAVENOUS
Status: DISPENSED
Start: 2018-11-01

## (undated) RX ORDER — DEXAMETHASONE SODIUM PHOSPHATE 4 MG/ML
INJECTION, SOLUTION INTRA-ARTICULAR; INTRALESIONAL; INTRAMUSCULAR; INTRAVENOUS; SOFT TISSUE
Status: DISPENSED
Start: 2018-11-01

## (undated) RX ORDER — FENTANYL CITRATE 50 UG/ML
INJECTION, SOLUTION INTRAMUSCULAR; INTRAVENOUS
Status: DISPENSED
Start: 2018-11-01

## (undated) RX ORDER — PROPOFOL 10 MG/ML
INJECTION, EMULSION INTRAVENOUS
Status: DISPENSED
Start: 2018-11-01

## (undated) RX ORDER — HYDROMORPHONE HYDROCHLORIDE 1 MG/ML
INJECTION, SOLUTION INTRAMUSCULAR; INTRAVENOUS; SUBCUTANEOUS
Status: DISPENSED
Start: 2018-11-01